# Patient Record
Sex: FEMALE | Race: BLACK OR AFRICAN AMERICAN | NOT HISPANIC OR LATINO | URBAN - METROPOLITAN AREA
[De-identification: names, ages, dates, MRNs, and addresses within clinical notes are randomized per-mention and may not be internally consistent; named-entity substitution may affect disease eponyms.]

---

## 2020-03-27 ENCOUNTER — INPATIENT (INPATIENT)
Facility: HOSPITAL | Age: 75
LOS: 11 days | Discharge: HOME | DRG: 177 | End: 2020-04-08
Attending: INTERNAL MEDICINE | Admitting: STUDENT IN AN ORGANIZED HEALTH CARE EDUCATION/TRAINING PROGRAM
Payer: MEDICARE

## 2020-03-27 VITALS
HEIGHT: 61 IN | DIASTOLIC BLOOD PRESSURE: 85 MMHG | SYSTOLIC BLOOD PRESSURE: 176 MMHG | WEIGHT: 182.98 LBS | RESPIRATION RATE: 20 BRPM | TEMPERATURE: 99 F | HEART RATE: 98 BPM | OXYGEN SATURATION: 94 %

## 2020-03-27 DIAGNOSIS — Z98.49 CATARACT EXTRACTION STATUS, UNSPECIFIED EYE: Chronic | ICD-10-CM

## 2020-03-27 PROCEDURE — 99285 EMERGENCY DEPT VISIT HI MDM: CPT

## 2020-03-27 PROCEDURE — 71045 X-RAY EXAM CHEST 1 VIEW: CPT | Mod: 26

## 2020-03-27 PROCEDURE — 93010 ELECTROCARDIOGRAM REPORT: CPT

## 2020-03-27 NOTE — ED PROVIDER NOTE - PROGRESS NOTE DETAILS
Alexander PGY1 - Discussed all results w/ pt., who understands them.  Pt. agreeable to admission.  All other questions and concerns have been addressed.  Pt. will be admitted.

## 2020-03-27 NOTE — ED PROVIDER NOTE - OBJECTIVE STATEMENT
74 y old f with hx DM hypertension ,HLD status post aortic valve replacement ,not on anticoagulation [facility], couth ,SOB for 1 week  apparently  chest xray shows bilateral pneumonia 74 y old f with hx DM hypertension ,HLD status post aortic valve replacement ,not on anticoagulation [facility] with fever, couth ,SOB, and occasional hemopt for 1 week  apparently chest xray at  shows bilateral pneumonia.  Pt. has also had three days of persistent NB diarrhea.  Pt. denies any recent long distance travel, 74 y old f with hx DM hypertension ,HLD status post aortic valve replacement ,not on anticoagulation [facility] fever, couth ,SOB, and occasional hemopt for 1 week  apparently chest xray at  shows bilateral pneumonia.  Pt. has also had three days of persistent NB diarrhea.  Pt. denies any recent long distance travel, leg pain, hx of DVT/PE, pulmonary diseases.  Pt. quit smoking over 30 years ago.

## 2020-03-28 DIAGNOSIS — Z29.9 ENCOUNTER FOR PROPHYLACTIC MEASURES, UNSPECIFIED: ICD-10-CM

## 2020-03-28 DIAGNOSIS — E11.9 TYPE 2 DIABETES MELLITUS WITHOUT COMPLICATIONS: ICD-10-CM

## 2020-03-28 DIAGNOSIS — Z95.2 PRESENCE OF PROSTHETIC HEART VALVE: ICD-10-CM

## 2020-03-28 DIAGNOSIS — J18.9 PNEUMONIA, UNSPECIFIED ORGANISM: ICD-10-CM

## 2020-03-28 DIAGNOSIS — Z95.2 PRESENCE OF PROSTHETIC HEART VALVE: Chronic | ICD-10-CM

## 2020-03-28 DIAGNOSIS — J96.91 RESPIRATORY FAILURE, UNSPECIFIED WITH HYPOXIA: ICD-10-CM

## 2020-03-28 DIAGNOSIS — I10 ESSENTIAL (PRIMARY) HYPERTENSION: ICD-10-CM

## 2020-03-28 DIAGNOSIS — F32.9 MAJOR DEPRESSIVE DISORDER, SINGLE EPISODE, UNSPECIFIED: ICD-10-CM

## 2020-03-28 DIAGNOSIS — M19.90 UNSPECIFIED OSTEOARTHRITIS, UNSPECIFIED SITE: ICD-10-CM

## 2020-03-28 DIAGNOSIS — E78.5 HYPERLIPIDEMIA, UNSPECIFIED: ICD-10-CM

## 2020-03-28 DIAGNOSIS — Z02.9 ENCOUNTER FOR ADMINISTRATIVE EXAMINATIONS, UNSPECIFIED: ICD-10-CM

## 2020-03-28 LAB
ALBUMIN SERPL ELPH-MCNC: 3.8 G/DL — SIGNIFICANT CHANGE UP (ref 3.3–5)
ALBUMIN SERPL ELPH-MCNC: 3.9 G/DL — SIGNIFICANT CHANGE UP (ref 3.3–5)
ALP SERPL-CCNC: 74 U/L — SIGNIFICANT CHANGE UP (ref 40–120)
ALP SERPL-CCNC: 79 U/L — SIGNIFICANT CHANGE UP (ref 40–120)
ALT FLD-CCNC: 23 U/L — SIGNIFICANT CHANGE UP (ref 10–45)
ALT FLD-CCNC: 25 U/L — SIGNIFICANT CHANGE UP (ref 10–45)
ANION GAP SERPL CALC-SCNC: 15 MMOL/L — SIGNIFICANT CHANGE UP (ref 5–17)
ANION GAP SERPL CALC-SCNC: 16 MMOL/L — SIGNIFICANT CHANGE UP (ref 5–17)
APPEARANCE UR: CLEAR — SIGNIFICANT CHANGE UP
APTT BLD: 75.4 SEC — HIGH (ref 27.5–36.3)
AST SERPL-CCNC: 42 U/L — HIGH (ref 10–40)
AST SERPL-CCNC: 43 U/L — HIGH (ref 10–40)
BACTERIA # UR AUTO: NEGATIVE — SIGNIFICANT CHANGE UP
BASOPHILS # BLD AUTO: 0 K/UL — SIGNIFICANT CHANGE UP (ref 0–0.2)
BASOPHILS # BLD AUTO: 0 K/UL — SIGNIFICANT CHANGE UP (ref 0–0.2)
BASOPHILS NFR BLD AUTO: 0 % — SIGNIFICANT CHANGE UP (ref 0–2)
BASOPHILS NFR BLD AUTO: 0 % — SIGNIFICANT CHANGE UP (ref 0–2)
BILIRUB SERPL-MCNC: 0.3 MG/DL — SIGNIFICANT CHANGE UP (ref 0.2–1.2)
BILIRUB SERPL-MCNC: 0.3 MG/DL — SIGNIFICANT CHANGE UP (ref 0.2–1.2)
BILIRUB UR-MCNC: NEGATIVE — SIGNIFICANT CHANGE UP
BUN SERPL-MCNC: 14 MG/DL — SIGNIFICANT CHANGE UP (ref 7–23)
BUN SERPL-MCNC: 15 MG/DL — SIGNIFICANT CHANGE UP (ref 7–23)
CALCIUM SERPL-MCNC: 9.3 MG/DL — SIGNIFICANT CHANGE UP (ref 8.4–10.5)
CALCIUM SERPL-MCNC: 9.4 MG/DL — SIGNIFICANT CHANGE UP (ref 8.4–10.5)
CHLORIDE SERPL-SCNC: 100 MMOL/L — SIGNIFICANT CHANGE UP (ref 96–108)
CHLORIDE SERPL-SCNC: 100 MMOL/L — SIGNIFICANT CHANGE UP (ref 96–108)
CO2 SERPL-SCNC: 22 MMOL/L — SIGNIFICANT CHANGE UP (ref 22–31)
CO2 SERPL-SCNC: 23 MMOL/L — SIGNIFICANT CHANGE UP (ref 22–31)
COLOR SPEC: YELLOW — SIGNIFICANT CHANGE UP
CREAT SERPL-MCNC: 0.77 MG/DL — SIGNIFICANT CHANGE UP (ref 0.5–1.3)
CREAT SERPL-MCNC: 0.78 MG/DL — SIGNIFICANT CHANGE UP (ref 0.5–1.3)
CRP SERPL-MCNC: 8.53 MG/DL — HIGH (ref 0–0.4)
DIFF PNL FLD: ABNORMAL
EOSINOPHIL # BLD AUTO: 0 K/UL — SIGNIFICANT CHANGE UP (ref 0–0.5)
EOSINOPHIL # BLD AUTO: 0 K/UL — SIGNIFICANT CHANGE UP (ref 0–0.5)
EOSINOPHIL NFR BLD AUTO: 0 % — SIGNIFICANT CHANGE UP (ref 0–6)
EOSINOPHIL NFR BLD AUTO: 0 % — SIGNIFICANT CHANGE UP (ref 0–6)
EPI CELLS # UR: 4 /HPF — SIGNIFICANT CHANGE UP
ERYTHROCYTE [SEDIMENTATION RATE] IN BLOOD: 111 MM/HR — HIGH (ref 0–20)
GLUCOSE SERPL-MCNC: 113 MG/DL — HIGH (ref 70–99)
GLUCOSE SERPL-MCNC: 124 MG/DL — HIGH (ref 70–99)
GLUCOSE UR QL: NEGATIVE — SIGNIFICANT CHANGE UP
HCT VFR BLD CALC: 39 % — SIGNIFICANT CHANGE UP (ref 34.5–45)
HCT VFR BLD CALC: 40.6 % — SIGNIFICANT CHANGE UP (ref 34.5–45)
HGB BLD-MCNC: 12.1 G/DL — SIGNIFICANT CHANGE UP (ref 11.5–15.5)
HGB BLD-MCNC: 12.9 G/DL — SIGNIFICANT CHANGE UP (ref 11.5–15.5)
HYALINE CASTS # UR AUTO: 1 /LPF — SIGNIFICANT CHANGE UP (ref 0–2)
INR BLD: 1.08 RATIO — SIGNIFICANT CHANGE UP (ref 0.88–1.16)
KETONES UR-MCNC: NEGATIVE — SIGNIFICANT CHANGE UP
LEUKOCYTE ESTERASE UR-ACNC: NEGATIVE — SIGNIFICANT CHANGE UP
LYMPHOCYTES # BLD AUTO: 0.86 K/UL — LOW (ref 1–3.3)
LYMPHOCYTES # BLD AUTO: 1.26 K/UL — SIGNIFICANT CHANGE UP (ref 1–3.3)
LYMPHOCYTES # BLD AUTO: 19.3 % — SIGNIFICANT CHANGE UP (ref 13–44)
LYMPHOCYTES # BLD AUTO: 22.3 % — SIGNIFICANT CHANGE UP (ref 13–44)
MAGNESIUM SERPL-MCNC: 1.8 MG/DL — SIGNIFICANT CHANGE UP (ref 1.6–2.6)
MANUAL SMEAR VERIFICATION: SIGNIFICANT CHANGE UP
MCHC RBC-ENTMCNC: 25.2 PG — LOW (ref 27–34)
MCHC RBC-ENTMCNC: 25.3 PG — LOW (ref 27–34)
MCHC RBC-ENTMCNC: 31 GM/DL — LOW (ref 32–36)
MCHC RBC-ENTMCNC: 31.8 GM/DL — LOW (ref 32–36)
MCV RBC AUTO: 79.6 FL — LOW (ref 80–100)
MCV RBC AUTO: 81.3 FL — SIGNIFICANT CHANGE UP (ref 80–100)
MONOCYTES # BLD AUTO: 0.19 K/UL — SIGNIFICANT CHANGE UP (ref 0–0.9)
MONOCYTES # BLD AUTO: 0.39 K/UL — SIGNIFICANT CHANGE UP (ref 0–0.9)
MONOCYTES NFR BLD AUTO: 3.3 % — SIGNIFICANT CHANGE UP (ref 2–14)
MONOCYTES NFR BLD AUTO: 8.8 % — SIGNIFICANT CHANGE UP (ref 2–14)
NEUTROPHILS # BLD AUTO: 3.19 K/UL — SIGNIFICANT CHANGE UP (ref 1.8–7.4)
NEUTROPHILS # BLD AUTO: 4.07 K/UL — SIGNIFICANT CHANGE UP (ref 1.8–7.4)
NEUTROPHILS NFR BLD AUTO: 70.2 % — SIGNIFICANT CHANGE UP (ref 43–77)
NEUTROPHILS NFR BLD AUTO: 71.9 % — SIGNIFICANT CHANGE UP (ref 43–77)
NITRITE UR-MCNC: NEGATIVE — SIGNIFICANT CHANGE UP
PH UR: 6.5 — SIGNIFICANT CHANGE UP (ref 5–8)
PHOSPHATE SERPL-MCNC: 3.6 MG/DL — SIGNIFICANT CHANGE UP (ref 2.5–4.5)
PLAT MORPH BLD: NORMAL — SIGNIFICANT CHANGE UP
PLATELET # BLD AUTO: 304 K/UL — SIGNIFICANT CHANGE UP (ref 150–400)
PLATELET # BLD AUTO: 322 K/UL — SIGNIFICANT CHANGE UP (ref 150–400)
POTASSIUM SERPL-MCNC: 3.8 MMOL/L — SIGNIFICANT CHANGE UP (ref 3.5–5.3)
POTASSIUM SERPL-MCNC: 3.9 MMOL/L — SIGNIFICANT CHANGE UP (ref 3.5–5.3)
POTASSIUM SERPL-SCNC: 3.8 MMOL/L — SIGNIFICANT CHANGE UP (ref 3.5–5.3)
POTASSIUM SERPL-SCNC: 3.9 MMOL/L — SIGNIFICANT CHANGE UP (ref 3.5–5.3)
PROCALCITONIN SERPL-MCNC: 0.16 NG/ML — HIGH (ref 0.02–0.1)
PROT SERPL-MCNC: 7.4 G/DL — SIGNIFICANT CHANGE UP (ref 6–8.3)
PROT SERPL-MCNC: 7.8 G/DL — SIGNIFICANT CHANGE UP (ref 6–8.3)
PROT UR-MCNC: ABNORMAL
PROTHROM AB SERPL-ACNC: 12.3 SEC — SIGNIFICANT CHANGE UP (ref 10–12.9)
RBC # BLD: 4.8 M/UL — SIGNIFICANT CHANGE UP (ref 3.8–5.2)
RBC # BLD: 5.1 M/UL — SIGNIFICANT CHANGE UP (ref 3.8–5.2)
RBC # FLD: 14.5 % — SIGNIFICANT CHANGE UP (ref 10.3–14.5)
RBC # FLD: 14.6 % — HIGH (ref 10.3–14.5)
RBC BLD AUTO: SIGNIFICANT CHANGE UP
RBC CASTS # UR COMP ASSIST: 2 /HPF — SIGNIFICANT CHANGE UP (ref 0–4)
SARS-COV-2 RNA SPEC QL NAA+PROBE: SIGNIFICANT CHANGE UP
SODIUM SERPL-SCNC: 138 MMOL/L — SIGNIFICANT CHANGE UP (ref 135–145)
SODIUM SERPL-SCNC: 140 MMOL/L — SIGNIFICANT CHANGE UP (ref 135–145)
SP GR SPEC: 1.03 — HIGH (ref 1.01–1.02)
TROPONIN T, HIGH SENSITIVITY RESULT: 22 NG/L — SIGNIFICANT CHANGE UP (ref 0–51)
UROBILINOGEN FLD QL: ABNORMAL
VARIANT LYMPHS # BLD: 2.5 % — SIGNIFICANT CHANGE UP (ref 0–6)
WBC # BLD: 4.44 K/UL — SIGNIFICANT CHANGE UP (ref 3.8–10.5)
WBC # BLD: 5.66 K/UL — SIGNIFICANT CHANGE UP (ref 3.8–10.5)
WBC # FLD AUTO: 4.44 K/UL — SIGNIFICANT CHANGE UP (ref 3.8–10.5)
WBC # FLD AUTO: 5.66 K/UL — SIGNIFICANT CHANGE UP (ref 3.8–10.5)
WBC UR QL: 3 /HPF — SIGNIFICANT CHANGE UP (ref 0–5)

## 2020-03-28 PROCEDURE — 12345: CPT | Mod: NC

## 2020-03-28 RX ORDER — AMLODIPINE BESYLATE 2.5 MG/1
1 TABLET ORAL
Qty: 0 | Refills: 0 | DISCHARGE

## 2020-03-28 RX ORDER — CEFTRIAXONE 500 MG/1
1000 INJECTION, POWDER, FOR SOLUTION INTRAMUSCULAR; INTRAVENOUS ONCE
Refills: 0 | Status: COMPLETED | OUTPATIENT
Start: 2020-03-28 | End: 2020-03-28

## 2020-03-28 RX ORDER — SERTRALINE 25 MG/1
50 TABLET, FILM COATED ORAL DAILY
Refills: 0 | Status: DISCONTINUED | OUTPATIENT
Start: 2020-03-28 | End: 2020-04-08

## 2020-03-28 RX ORDER — ATORVASTATIN CALCIUM 80 MG/1
20 TABLET, FILM COATED ORAL AT BEDTIME
Refills: 0 | Status: DISCONTINUED | OUTPATIENT
Start: 2020-03-28 | End: 2020-04-08

## 2020-03-28 RX ORDER — ATORVASTATIN CALCIUM 80 MG/1
1 TABLET, FILM COATED ORAL
Qty: 0 | Refills: 0 | DISCHARGE

## 2020-03-28 RX ORDER — CARVEDILOL PHOSPHATE 80 MG/1
1 CAPSULE, EXTENDED RELEASE ORAL
Qty: 0 | Refills: 0 | DISCHARGE

## 2020-03-28 RX ORDER — SODIUM CHLORIDE 9 MG/ML
1000 INJECTION, SOLUTION INTRAVENOUS
Refills: 0 | Status: DISCONTINUED | OUTPATIENT
Start: 2020-03-28 | End: 2020-04-08

## 2020-03-28 RX ORDER — CEFTRIAXONE 500 MG/1
INJECTION, POWDER, FOR SOLUTION INTRAMUSCULAR; INTRAVENOUS
Refills: 0 | Status: DISCONTINUED | OUTPATIENT
Start: 2020-03-28 | End: 2020-03-29

## 2020-03-28 RX ORDER — SITAGLIPTIN AND METFORMIN HYDROCHLORIDE 500; 50 MG/1; MG/1
1 TABLET, FILM COATED ORAL
Qty: 0 | Refills: 0 | DISCHARGE

## 2020-03-28 RX ORDER — INSULIN LISPRO 100/ML
VIAL (ML) SUBCUTANEOUS
Refills: 0 | Status: DISCONTINUED | OUTPATIENT
Start: 2020-03-28 | End: 2020-04-08

## 2020-03-28 RX ORDER — INSULIN LISPRO 100/ML
VIAL (ML) SUBCUTANEOUS AT BEDTIME
Refills: 0 | Status: DISCONTINUED | OUTPATIENT
Start: 2020-03-28 | End: 2020-04-08

## 2020-03-28 RX ORDER — SERTRALINE 25 MG/1
1 TABLET, FILM COATED ORAL
Qty: 0 | Refills: 0 | DISCHARGE

## 2020-03-28 RX ORDER — ENOXAPARIN SODIUM 100 MG/ML
40 INJECTION SUBCUTANEOUS DAILY
Refills: 0 | Status: DISCONTINUED | OUTPATIENT
Start: 2020-03-28 | End: 2020-04-08

## 2020-03-28 RX ORDER — DEXTROSE 50 % IN WATER 50 %
12.5 SYRINGE (ML) INTRAVENOUS ONCE
Refills: 0 | Status: DISCONTINUED | OUTPATIENT
Start: 2020-03-28 | End: 2020-04-08

## 2020-03-28 RX ORDER — ACETAMINOPHEN 500 MG
975 TABLET ORAL ONCE
Refills: 0 | Status: COMPLETED | OUTPATIENT
Start: 2020-03-28 | End: 2020-03-28

## 2020-03-28 RX ORDER — AMLODIPINE BESYLATE 2.5 MG/1
10 TABLET ORAL DAILY
Refills: 0 | Status: DISCONTINUED | OUTPATIENT
Start: 2020-03-28 | End: 2020-04-08

## 2020-03-28 RX ORDER — AZITHROMYCIN 500 MG/1
500 TABLET, FILM COATED ORAL ONCE
Refills: 0 | Status: COMPLETED | OUTPATIENT
Start: 2020-03-28 | End: 2020-03-28

## 2020-03-28 RX ORDER — PANTOPRAZOLE SODIUM 20 MG/1
40 TABLET, DELAYED RELEASE ORAL
Refills: 0 | Status: DISCONTINUED | OUTPATIENT
Start: 2020-03-28 | End: 2020-04-08

## 2020-03-28 RX ORDER — DEXTROSE 50 % IN WATER 50 %
25 SYRINGE (ML) INTRAVENOUS ONCE
Refills: 0 | Status: DISCONTINUED | OUTPATIENT
Start: 2020-03-28 | End: 2020-04-08

## 2020-03-28 RX ORDER — AZITHROMYCIN 500 MG/1
TABLET, FILM COATED ORAL
Refills: 0 | Status: DISCONTINUED | OUTPATIENT
Start: 2020-03-28 | End: 2020-03-29

## 2020-03-28 RX ORDER — DEXTROSE 50 % IN WATER 50 %
15 SYRINGE (ML) INTRAVENOUS ONCE
Refills: 0 | Status: DISCONTINUED | OUTPATIENT
Start: 2020-03-28 | End: 2020-04-08

## 2020-03-28 RX ORDER — AZITHROMYCIN 500 MG/1
500 TABLET, FILM COATED ORAL EVERY 24 HOURS
Refills: 0 | Status: DISCONTINUED | OUTPATIENT
Start: 2020-03-29 | End: 2020-03-29

## 2020-03-28 RX ORDER — CEFTRIAXONE 500 MG/1
1000 INJECTION, POWDER, FOR SOLUTION INTRAMUSCULAR; INTRAVENOUS EVERY 24 HOURS
Refills: 0 | Status: DISCONTINUED | OUTPATIENT
Start: 2020-03-29 | End: 2020-03-29

## 2020-03-28 RX ORDER — DAPAGLIFLOZIN 10 MG/1
1 TABLET, FILM COATED ORAL
Qty: 0 | Refills: 0 | DISCHARGE

## 2020-03-28 RX ORDER — ACETAMINOPHEN 500 MG
650 TABLET ORAL EVERY 6 HOURS
Refills: 0 | Status: DISCONTINUED | OUTPATIENT
Start: 2020-03-28 | End: 2020-04-08

## 2020-03-28 RX ORDER — CARVEDILOL PHOSPHATE 80 MG/1
25 CAPSULE, EXTENDED RELEASE ORAL EVERY 12 HOURS
Refills: 0 | Status: DISCONTINUED | OUTPATIENT
Start: 2020-03-28 | End: 2020-04-08

## 2020-03-28 RX ORDER — GLUCAGON INJECTION, SOLUTION 0.5 MG/.1ML
1 INJECTION, SOLUTION SUBCUTANEOUS ONCE
Refills: 0 | Status: DISCONTINUED | OUTPATIENT
Start: 2020-03-28 | End: 2020-04-08

## 2020-03-28 RX ADMIN — ENOXAPARIN SODIUM 40 MILLIGRAM(S): 100 INJECTION SUBCUTANEOUS at 08:42

## 2020-03-28 RX ADMIN — AMLODIPINE BESYLATE 10 MILLIGRAM(S): 2.5 TABLET ORAL at 05:47

## 2020-03-28 RX ADMIN — Medication 200 MILLIGRAM(S): at 12:50

## 2020-03-28 RX ADMIN — SERTRALINE 50 MILLIGRAM(S): 25 TABLET, FILM COATED ORAL at 12:50

## 2020-03-28 RX ADMIN — Medication 2: at 13:25

## 2020-03-28 RX ADMIN — Medication 975 MILLIGRAM(S): at 00:40

## 2020-03-28 RX ADMIN — CARVEDILOL PHOSPHATE 25 MILLIGRAM(S): 80 CAPSULE, EXTENDED RELEASE ORAL at 17:42

## 2020-03-28 RX ADMIN — Medication 100 MILLIGRAM(S): at 22:45

## 2020-03-28 RX ADMIN — Medication 650 MILLIGRAM(S): at 23:20

## 2020-03-28 RX ADMIN — CARVEDILOL PHOSPHATE 25 MILLIGRAM(S): 80 CAPSULE, EXTENDED RELEASE ORAL at 05:47

## 2020-03-28 RX ADMIN — Medication 200 MILLIGRAM(S): at 22:49

## 2020-03-28 RX ADMIN — AZITHROMYCIN 250 MILLIGRAM(S): 500 TABLET, FILM COATED ORAL at 13:24

## 2020-03-28 RX ADMIN — CEFTRIAXONE 100 MILLIGRAM(S): 500 INJECTION, POWDER, FOR SOLUTION INTRAMUSCULAR; INTRAVENOUS at 12:50

## 2020-03-28 RX ADMIN — Medication 650 MILLIGRAM(S): at 22:48

## 2020-03-28 RX ADMIN — Medication 100 MILLIGRAM(S): at 05:47

## 2020-03-28 RX ADMIN — Medication 975 MILLIGRAM(S): at 00:10

## 2020-03-28 RX ADMIN — Medication 650 MILLIGRAM(S): at 14:54

## 2020-03-28 RX ADMIN — PANTOPRAZOLE SODIUM 40 MILLIGRAM(S): 20 TABLET, DELAYED RELEASE ORAL at 08:41

## 2020-03-28 RX ADMIN — ATORVASTATIN CALCIUM 20 MILLIGRAM(S): 80 TABLET, FILM COATED ORAL at 22:45

## 2020-03-28 RX ADMIN — Medication 100 MILLIGRAM(S): at 13:24

## 2020-03-28 NOTE — H&P ADULT - PROBLEM SELECTOR PLAN 1
Patient with high suspicion for COVID, initial test was negative, pending repeat testing; can't rule out superimposed bacterial pneumonia  hold off antibiotics at this time  - Continue with supplemental oxygen with goal SpO2>92, if requires escalate to NRB and avoid BiLevel/High Flow Nasal Cannula per institution policy given risk of aerosolization  - If COVID positive, Start Hydroxychloroquine 400mg x2 doses and then 200mg BID for 4 days. The Benefits/Risks/Alternative discussed with next of kin in agreement with therapy although not currently FDA-approved for the treatment of COVID19  check ESR, CRP, Procalcitonin  - monitor EKG for QTc prolongation  - guaifenessin/tessalon perles/albuterol inh prn, acetaminophen prn  - isolation precautions Patient with hypoxic respiratory failure, likely 2/2 to COVID; can't r/o superimposed bacterial pneumonia  c/w oxygen  c/w other symptomatic management

## 2020-03-28 NOTE — H&P ADULT - NSHPLABSRESULTS_GEN_ALL_CORE
Labs personally reviewed:                          12.9   5.66  )-----------( 322      ( 27 Mar 2020 23:52 )             40.6     03-27    138  |  100  |  14  ----------------------------<  124<H>  3.9   |  23  |  0.78    Ca    9.4      27 Mar 2020 23:52    TPro  7.8  /  Alb  3.8  /  TBili  0.3  /  DBili  x   /  AST  42<H>  /  ALT  23  /  AlkPhos  79  03-27        LIVER FUNCTIONS - ( 27 Mar 2020 23:52 )  Alb: 3.8 g/dL / Pro: 7.8 g/dL / ALK PHOS: 79 U/L / ALT: 23 U/L / AST: 42 U/L / GGT: x           PT/INR - ( 27 Mar 2020 23:52 )   PT: 12.3 sec;   INR: 1.08 ratio         PTT - ( 27 Mar 2020 23:52 )  PTT:75.4 sec    CAPILLARY BLOOD GLUCOSE          Imaging:  CXR personally reviewed:   Bilateral patchy opacities, likely representing multifocal pneumonia (differential considerations include viral pneumonia, such as COVID-19).    EKG personally reviewed: nsr, QTc 447

## 2020-03-28 NOTE — H&P ADULT - PROBLEM SELECTOR PLAN 9
1.  Name of PCP:  Dr. Margi Aparicio  2.  PCP Contacted on Admission: [ ] Y    [x ] N    3.  PCP contacted at Discharge: [ ] Y    [ ] N    [ ] N/A  4.  Post-Discharge Appointment Date and Location:  5.  Summary of Handoff given to PCP: lovenox

## 2020-03-28 NOTE — H&P ADULT - PROBLEM SELECTOR PLAN 2
bioprosthetic valve, not on AC Patient with high suspicion for COVID, initial test was negative, pending repeat testing; can't rule out superimposed bacterial pneumonia  hold off antibiotics at this time  - Continue with supplemental oxygen with goal SpO2>92, if requires escalate to NRB and avoid BiLevel/High Flow Nasal Cannula per institution policy given risk of aerosolization  - If COVID positive, Start Hydroxychloroquine 400mg x2 doses and then 200mg BID for 4 days. The Benefits/Risks/Alternative discussed with next of kin in agreement with therapy although not currently FDA-approved for the treatment of COVID19  check ESR, CRP, Procalcitonin  - monitor EKG for QTc prolongation  - guaifenessin/tessalon perles/albuterol inh prn, acetaminophen prn  - isolation precautions Patient with high suspicion for COVID, initial test was negative, pending repeat testing; can't rule out superimposed bacterial pneumonia  hold off antibiotics at this time  - Continue with supplemental oxygen with goal SpO2>92, if requires escalate to NRB and avoid BiLevel/High Flow Nasal Cannula per institution policy given risk of aerosolization  - If COVID positive, Start Hydroxychloroquine 400mg x2 doses and then 200mg BID for 4 days. The Benefits/Risks/Alternative discussed with next of kin in agreement with therapy although not currently FDA-approved for the treatment of COVID19  check ESR, CRP, Procalcitonin  - monitor EKG for QTc prolongation  - guaifenessin/tessalon perles/albuterol inh prn, acetaminophen prn  - isolation precautions  - f/u blood culture Patient with high suspicion for COVID, initial test was negative, pending repeat testing; can't rule out superimposed bacterial pneumonia  hold off antibiotics at this time  - Continue with supplemental oxygen with goal SpO2>92, if requires escalate to NRB and avoid BiLevel/High Flow Nasal Cannula per institution policy given risk of aerosolization  - If COVID positive, Start Hydroxychloroquine 400mg x2 doses and then 200mg BID for 4 days. The Benefits/Risks/Alternative discussed with next of kin in agreement with therapy although not currently FDA-approved for the treatment of COVID19  check ESR, CRP, Procalcitonin  - monitor EKG for QTc prolongation  - guaifenessin/tessalon perles/ acetaminophen prn  - isolation precautions  - f/u blood culture

## 2020-03-28 NOTE — H&P ADULT - NSHPPHYSICALEXAM_GEN_ALL_CORE
PHYSICAL EXAM:  Vital Signs Last 24 Hrs  T(C): 36.8 (03-28-20 @ 02:31)  T(F): 98.2 (03-28-20 @ 02:31), Max: 100.4 (03-28-20 @ 00:01)  HR: 80 (03-28-20 @ 02:31) (80 - 98)  BP: 145/79 (03-28-20 @ 02:31)  BP(mean): --  RR: 20 (03-28-20 @ 02:31) (18 - 25)  SpO2: 98% (03-28-20 @ 02:31) (92% - 98%)  Wt(kg): --    Physical exam deferred due to concern for COVID  Please refer to ED exam note as detailed below    · CONSTITUTIONAL: morbidly obese  awake, alert, oriented to person, place, time/situatino  dyspneic  · ENMT: Airway patent, Nasal mucosa clear. Mouth with normal mucosa. Throat has no vesicles, no oropharyngeal exudates and uvula is midline.  · EYES: Clear bilaterally, pupils equal, round and reactive to light.  · CARDIAC: Normal rate, regular rhythm.  Heart sounds S1, S syst m on apex  · RESPIRATORY: bilateral rales  · GASTROINTESTINAL: Abdomen soft, non-tender, no guarding.  · MUSCULOSKELETAL: Spine appears normal, range of motion is not limited, no muscle or joint tenderness  · NEUROLOGICAL: Alert and oriented, no focal deficits, no motor or sensory deficits.  · SKIN: Skin normal color for race, warm, dry and intact. No evidence of rash.  · PSYCHIATRIC: Alert and oriented to person, place, time/situation. normal mood and affect. no apparent risk to self or others.  · HEME LYMPH: No adenopathy or splenomegaly. No cervical or inguinal lymphadenopathy.

## 2020-03-28 NOTE — H&P ADULT - NSICDXPASTSURGICALHX_GEN_ALL_CORE_FT
PAST SURGICAL HISTORY:  History of cataract surgery     S/P AVR     S/P  section     S/P cholecystectomy

## 2020-03-28 NOTE — ED ADULT NURSE NOTE - OBJECTIVE STATEMENT
73 y/o F A&Ox3 with PMH of DM, HTN, HLD status post aortic valve replacement, presents to the ED sent from [facility] c/o fever, cough SOB. Pt. had outpatient chest xray done at  showing bilateral pneumonia. Pt. endorses SOB, worse during exertion. Also reports occasional hemoptosis     came in from [facility] with fever, couth ,SOB, and occasional hemopt for 1 week  apparently chest xray at  shows bilateral pneumonia.  Pt. has also had three days of persistent NB diarrhea.  Pt. denies any recent long distance travel, leg pain, hx of DVT/PE, pulmonary diseases.  Pt. quit smoking over 30 years ago. 75 y/o F A&Ox3 with PMH of DM, HTN, HLD status post aortic valve replacement, [facility], cough SOB. Pt. had outpatient chest x ray done at urgent care showing bilateral pneumonia. Pt. endorses SOB, worse during exertion. Also reports occasional hemoptysis x 1 week. Upon assessment, pt. oral temp of 100.4 - states she did not take any Tylenol today. Pt. appears slightly tachypneic. pt. was sating at 92% on RA - placed on 2L NC sating at 96%. Pt. also reports nausea and diarrhea x3 days. Denies CP, dizziness, and recent travel. Stopped smoking 30 years ago. Safety and comfort provided.

## 2020-03-28 NOTE — PATIENT PROFILE ADULT - NSPRESCRALCAMT_GEN_A_NUR
Lisa,I'm taking a screw out in clinic. I need the screwdriver for the synthes 4.0 screw. Can I grab that from you on Wednesday?Thanks
1 or 2

## 2020-03-28 NOTE — ED ADULT NURSE NOTE - PMH
Aortic insufficiency    CAD (coronary artery disease)    Disk prolapse    DM (diabetes mellitus)    HLD (hyperlipidemia)    HTN (hypertension)    Obesity (BMI 30-39.9)    FLORES (obstructive sleep apnea)  by criteria

## 2020-03-28 NOTE — H&P ADULT - PROBLEM SELECTOR PLAN 10
1.  Name of PCP:  Dr. Margi Aparicio  2.  PCP Contacted on Admission: [ ] Y    [x ] N    3.  PCP contacted at Discharge: [ ] Y    [ ] N    [ ] N/A  4.  Post-Discharge Appointment Date and Location:  5.  Summary of Handoff given to PCP:

## 2020-03-28 NOTE — ED ADULT NURSE REASSESSMENT NOTE - NS ED NURSE REASSESS COMMENT FT1
Report received from Alejandra. Pt AAOx4, NAD, resp nonlabored, skin warm/dry, resting comfortably in bed. Pt denies headache, dizziness, chest pain, palpitations, SOB, abd pain, n/v/d, urinary symptoms, fevers, chills, weakness at this time. Pt awaiting bed. Safety maintained.

## 2020-03-28 NOTE — H&P ADULT - ATTENDING COMMENTS
Patient assigned to me by night hospitalist in charge for management and care for patient for this evening only. Care to be resumed by day hospitalist (Dr. Sawant) in the morning and thereafter.     Patient's care rendered on 3/28/2020 at 4AM.      I was physically present for the key portions of the evaluation and management (E/M) service provided.  I agree with the above history, physical, and plan which I have reviewed and edited where appropriate.     Plan discussed with Patient, RN, son, George

## 2020-03-28 NOTE — CHART NOTE - NSCHARTNOTEFT_GEN_A_CORE
Patient seen and examined.   Case discussed over phone with son, who provided additional history: Son works for [employer]. And patients sister passed away 3/27 from COVID-19.     Labs reviewed: CRP 8.53  Procal: 0.16  COVID-19: Negative    CXR reviewed: bilateral patchy hazy opacities consistent with pneumonia verse edema  ECG: QTc 447    A/P 73 yo female with history of HTN, HLD, T2DM, Arthritis, Aortic insufficiency s/p AVR (bioprosthetic) presented with fevers, dyspnea, cough and found to have CXR findings concerning for pneumonia with superimposed edema    PNA complicated by hypoxic respiratory failure: We are ruling out COVID, which seems appropriate as she is high risk with known contacts  In the interim, will start antibiotics, which can be discontinued if COVID is negative  Stable on 2L NC, will titrate as guided by pulse ox  Tylenol for fevers (added)  Will send urine legionella, and if COVID negative, RVP should be sent  Will add on troponin

## 2020-03-28 NOTE — H&P ADULT - NSICDXPASTMEDICALHX_GEN_ALL_CORE_FT
PAST MEDICAL HISTORY:  Aortic insufficiency     Arthritis     CAD (coronary artery disease)     Disk prolapse     DM (diabetes mellitus)     HLD (hyperlipidemia)     HTN (hypertension)     Obesity (BMI 30-39.9)     FLORES (obstructive sleep apnea) by criteria PAST MEDICAL HISTORY:  Aortic insufficiency     Arthritis     CAD (coronary artery disease)     Depression     Disk prolapse     DM (diabetes mellitus)     HLD (hyperlipidemia)     HTN (hypertension)     Obesity (BMI 30-39.9)     FLORES (obstructive sleep apnea) by criteria

## 2020-03-29 LAB
LEGIONELLA AG UR QL: NEGATIVE — SIGNIFICANT CHANGE UP
SARS-COV-2 RNA SPEC QL NAA+PROBE: DETECTED

## 2020-03-29 PROCEDURE — 99233 SBSQ HOSP IP/OBS HIGH 50: CPT

## 2020-03-29 PROCEDURE — 93010 ELECTROCARDIOGRAM REPORT: CPT

## 2020-03-29 RX ORDER — HYDROXYCHLOROQUINE SULFATE 200 MG
400 TABLET ORAL EVERY 12 HOURS
Refills: 0 | Status: COMPLETED | OUTPATIENT
Start: 2020-03-29 | End: 2020-03-29

## 2020-03-29 RX ORDER — ASCORBIC ACID 60 MG
500 TABLET,CHEWABLE ORAL DAILY
Refills: 0 | Status: DISCONTINUED | OUTPATIENT
Start: 2020-03-29 | End: 2020-04-08

## 2020-03-29 RX ORDER — AZITHROMYCIN 500 MG/1
500 TABLET, FILM COATED ORAL EVERY 24 HOURS
Refills: 0 | Status: COMPLETED | OUTPATIENT
Start: 2020-03-30 | End: 2020-03-30

## 2020-03-29 RX ORDER — THIAMINE MONONITRATE (VIT B1) 100 MG
100 TABLET ORAL DAILY
Refills: 0 | Status: DISCONTINUED | OUTPATIENT
Start: 2020-03-29 | End: 2020-04-08

## 2020-03-29 RX ORDER — HYDROXYCHLOROQUINE SULFATE 200 MG
TABLET ORAL
Refills: 0 | Status: COMPLETED | OUTPATIENT
Start: 2020-03-29 | End: 2020-04-02

## 2020-03-29 RX ORDER — HYDROXYCHLOROQUINE SULFATE 200 MG
200 TABLET ORAL EVERY 12 HOURS
Refills: 0 | Status: COMPLETED | OUTPATIENT
Start: 2020-03-30 | End: 2020-04-02

## 2020-03-29 RX ORDER — ZINC SULFATE TAB 220 MG (50 MG ZINC EQUIVALENT) 220 (50 ZN) MG
220 TAB ORAL DAILY
Refills: 0 | Status: DISCONTINUED | OUTPATIENT
Start: 2020-03-29 | End: 2020-04-08

## 2020-03-29 RX ADMIN — Medication 100 MILLIGRAM(S): at 23:01

## 2020-03-29 RX ADMIN — CEFTRIAXONE 100 MILLIGRAM(S): 500 INJECTION, POWDER, FOR SOLUTION INTRAMUSCULAR; INTRAVENOUS at 11:58

## 2020-03-29 RX ADMIN — PANTOPRAZOLE SODIUM 40 MILLIGRAM(S): 20 TABLET, DELAYED RELEASE ORAL at 06:09

## 2020-03-29 RX ADMIN — SERTRALINE 50 MILLIGRAM(S): 25 TABLET, FILM COATED ORAL at 11:58

## 2020-03-29 RX ADMIN — AMLODIPINE BESYLATE 10 MILLIGRAM(S): 2.5 TABLET ORAL at 06:09

## 2020-03-29 RX ADMIN — Medication 650 MILLIGRAM(S): at 05:50

## 2020-03-29 RX ADMIN — Medication 100 MILLIGRAM(S): at 16:14

## 2020-03-29 RX ADMIN — Medication 650 MILLIGRAM(S): at 23:01

## 2020-03-29 RX ADMIN — ATORVASTATIN CALCIUM 20 MILLIGRAM(S): 80 TABLET, FILM COATED ORAL at 23:01

## 2020-03-29 RX ADMIN — AZITHROMYCIN 250 MILLIGRAM(S): 500 TABLET, FILM COATED ORAL at 12:49

## 2020-03-29 RX ADMIN — Medication 100 MILLIGRAM(S): at 06:09

## 2020-03-29 RX ADMIN — Medication 400 MILLIGRAM(S): at 19:35

## 2020-03-29 RX ADMIN — CARVEDILOL PHOSPHATE 25 MILLIGRAM(S): 80 CAPSULE, EXTENDED RELEASE ORAL at 19:35

## 2020-03-29 RX ADMIN — CARVEDILOL PHOSPHATE 25 MILLIGRAM(S): 80 CAPSULE, EXTENDED RELEASE ORAL at 06:09

## 2020-03-29 RX ADMIN — Medication 650 MILLIGRAM(S): at 06:15

## 2020-03-29 RX ADMIN — Medication 400 MILLIGRAM(S): at 09:11

## 2020-03-29 NOTE — PROGRESS NOTE ADULT - SUBJECTIVE AND OBJECTIVE BOX
Patient is a 74y old  Female who presents with a chief complaint of cough and SOB (29 Mar 2020 11:49)        SUBJECTIVE / OVERNIGHT EVENTS: Patient reports some cough and SOB.       MEDICATIONS  (STANDING):  amLODIPine   Tablet 10 milliGRAM(s) Oral daily  atorvastatin 20 milliGRAM(s) Oral at bedtime  azithromycin  IVPB      azithromycin  IVPB 500 milliGRAM(s) IV Intermittent every 24 hours  benzonatate 100 milliGRAM(s) Oral every 8 hours  carvedilol 25 milliGRAM(s) Oral every 12 hours  cefTRIAXone   IVPB      cefTRIAXone   IVPB 1000 milliGRAM(s) IV Intermittent every 24 hours  dextrose 5%. 1000 milliLiter(s) (50 mL/Hr) IV Continuous <Continuous>  dextrose 50% Injectable 12.5 Gram(s) IV Push once  dextrose 50% Injectable 25 Gram(s) IV Push once  dextrose 50% Injectable 25 Gram(s) IV Push once  enoxaparin Injectable 40 milliGRAM(s) SubCutaneous daily  hydroxychloroquine 400 milliGRAM(s) Oral every 12 hours  hydroxychloroquine   Oral   insulin lispro (HumaLOG) corrective regimen sliding scale   SubCutaneous three times a day before meals  insulin lispro (HumaLOG) corrective regimen sliding scale   SubCutaneous at bedtime  pantoprazole    Tablet 40 milliGRAM(s) Oral before breakfast  sertraline 50 milliGRAM(s) Oral daily    MEDICATIONS  (PRN):  acetaminophen   Tablet .. 650 milliGRAM(s) Oral every 6 hours PRN Temp greater or equal to 38C (100.4F), Mild Pain (1 - 3)  dextrose 40% Gel 15 Gram(s) Oral once PRN Blood Glucose LESS THAN 70 milliGRAM(s)/deciliter  glucagon  Injectable 1 milliGRAM(s) IntraMuscular once PRN Glucose LESS THAN 70 milligrams/deciliter  guaiFENesin   Syrup  (Sugar-Free) 200 milliGRAM(s) Oral every 6 hours PRN Cough      Vital Signs Last 24 Hrs  T(C): 37.8 (29 Mar 2020 14:05), Max: 39.1 (28 Mar 2020 22:30)  T(F): 100.1 (29 Mar 2020 14:05), Max: 102.4 (28 Mar 2020 22:30)  HR: 82 (29 Mar 2020 14:05) (74 - 82)  BP: 115/57 (29 Mar 2020 14:05) (115/57 - 156/75)  BP(mean): --  RR: 20 (29 Mar 2020 14:05) (20 - 20)  SpO2: 89% (29 Mar 2020 14:05) (89% - 97%)  CAPILLARY BLOOD GLUCOSE      POCT Blood Glucose.: 139 mg/dL (29 Mar 2020 18:28)  POCT Blood Glucose.: 212 mg/dL (29 Mar 2020 13:59)  POCT Blood Glucose.: 111 mg/dL (29 Mar 2020 09:22)  POCT Blood Glucose.: 131 mg/dL (28 Mar 2020 21:26)    I&O's Summary    28 Mar 2020 07:01  -  29 Mar 2020 07:00  --------------------------------------------------------  IN: 120 mL / OUT: 200 mL / NET: -80 mL          PHYSICAL EXAM:   GENERAL: NAD, well-developed  HEAD:  Atraumatic, Normocephalic  EYES: Conjunctiva and sclera clear  NECK: Supple  CHEST/LUNG: Distant BS  HEART: S1S2 normal. Regular rate and rhythm; No murmurs, rubs, or gallops  ABDOMEN: Soft, Nontender, Nondistended; Bowel sounds present  EXTREMITIES:  Trace LE edema  PSYCH/Neuro: AAOx3. Non-focal.   SKIN: No rashes or lesions      LABS:                        12.1   4.44  )-----------( 304      ( 28 Mar 2020 06:32 )             39.0     03-28    140  |  100  |  15  ----------------------------<  113<H>  3.8   |  22  |  0.77    Ca    9.3      28 Mar 2020 06:32  Phos  3.6     03-28  Mg     1.8     03-28    TPro  7.4  /  Alb  3.9  /  TBili  0.3  /  DBili  x   /  AST  43<H>  /  ALT  25  /  AlkPhos  74  03-28    PT/INR - ( 27 Mar 2020 23:52 )   PT: 12.3 sec;   INR: 1.08 ratio         PTT - ( 27 Mar 2020 23:52 )  PTT:75.4 sec      Urinalysis Basic - ( 28 Mar 2020 15:33 )    Color: Yellow / Appearance: Clear / S.029 / pH: x  Gluc: x / Ketone: Negative  / Bili: Negative / Urobili: 2 mg/dL   Blood: x / Protein: 300 mg/dL / Nitrite: Negative   Leuk Esterase: Negative / RBC: 2 /hpf / WBC 3 /HPF   Sq Epi: x / Non Sq Epi: 4 /hpf / Bacteria: Negative          RADIOLOGY & ADDITIONAL TESTS:    Imaging Personally Reviewed:  Consultant(s) Notes Reviewed:    Care Discussed with Consultants/Other Providers:

## 2020-03-29 NOTE — PROGRESS NOTE ADULT - PROBLEM SELECTOR PLAN 1
Patient with hypoxic respiratory failure, 2/2 to COVID.  c/w oxygen, wean as tolerated.   c/w other symptomatic management  -Zinc, thiamine, Vitamin C.   -Trend CRP, ESR, ferritin, LDH.

## 2020-03-29 NOTE — PROGRESS NOTE ADULT - ATTENDING COMMENTS
Dae Szymanski MD  Division of [facility]  Cell: [phone]  Pager: [phone]  Office: [phone]

## 2020-03-30 LAB
ALBUMIN SERPL ELPH-MCNC: 3.1 G/DL — LOW (ref 3.3–5)
ALP SERPL-CCNC: 77 U/L — SIGNIFICANT CHANGE UP (ref 40–120)
ALT FLD-CCNC: 37 U/L — SIGNIFICANT CHANGE UP (ref 10–45)
ANION GAP SERPL CALC-SCNC: 12 MMOL/L — SIGNIFICANT CHANGE UP (ref 5–17)
AST SERPL-CCNC: 55 U/L — HIGH (ref 10–40)
BASOPHILS # BLD AUTO: 0 K/UL — SIGNIFICANT CHANGE UP (ref 0–0.2)
BASOPHILS NFR BLD AUTO: 0 % — SIGNIFICANT CHANGE UP (ref 0–2)
BILIRUB SERPL-MCNC: 0.3 MG/DL — SIGNIFICANT CHANGE UP (ref 0.2–1.2)
BUN SERPL-MCNC: 17 MG/DL — SIGNIFICANT CHANGE UP (ref 7–23)
CALCIUM SERPL-MCNC: 9.1 MG/DL — SIGNIFICANT CHANGE UP (ref 8.4–10.5)
CHLORIDE SERPL-SCNC: 99 MMOL/L — SIGNIFICANT CHANGE UP (ref 96–108)
CK SERPL-CCNC: 150 U/L — SIGNIFICANT CHANGE UP (ref 25–170)
CO2 SERPL-SCNC: 26 MMOL/L — SIGNIFICANT CHANGE UP (ref 22–31)
CREAT SERPL-MCNC: 0.93 MG/DL — SIGNIFICANT CHANGE UP (ref 0.5–1.3)
CRP SERPL-MCNC: 15.13 MG/DL — HIGH (ref 0–0.4)
D DIMER BLD IA.RAPID-MCNC: 455 NG/ML DDU — HIGH
EOSINOPHIL # BLD AUTO: 0 K/UL — SIGNIFICANT CHANGE UP (ref 0–0.5)
EOSINOPHIL NFR BLD AUTO: 0 % — SIGNIFICANT CHANGE UP (ref 0–6)
ERYTHROCYTE [SEDIMENTATION RATE] IN BLOOD: 78 MM/HR — HIGH (ref 0–20)
FERRITIN SERPL-MCNC: 382 NG/ML — HIGH (ref 15–150)
GIANT PLATELETS BLD QL SMEAR: PRESENT — SIGNIFICANT CHANGE UP
GLUCOSE BLDC GLUCOMTR-MCNC: 154 MG/DL — HIGH (ref 70–99)
GLUCOSE SERPL-MCNC: 111 MG/DL — HIGH (ref 70–99)
HCT VFR BLD CALC: 35.5 % — SIGNIFICANT CHANGE UP (ref 34.5–45)
HGB BLD-MCNC: 10.9 G/DL — LOW (ref 11.5–15.5)
LDH SERPL L TO P-CCNC: 551 U/L — HIGH (ref 50–242)
LYMPHOCYTES # BLD AUTO: 0.97 K/UL — LOW (ref 1–3.3)
LYMPHOCYTES # BLD AUTO: 16.8 % — SIGNIFICANT CHANGE UP (ref 13–44)
MANUAL SMEAR VERIFICATION: SIGNIFICANT CHANGE UP
MCHC RBC-ENTMCNC: 24.9 PG — LOW (ref 27–34)
MCHC RBC-ENTMCNC: 30.7 GM/DL — LOW (ref 32–36)
MCV RBC AUTO: 81.2 FL — SIGNIFICANT CHANGE UP (ref 80–100)
MONOCYTES # BLD AUTO: 0.1 K/UL — SIGNIFICANT CHANGE UP (ref 0–0.9)
MONOCYTES NFR BLD AUTO: 1.8 % — LOW (ref 2–14)
NEUTROPHILS # BLD AUTO: 4.7 K/UL — SIGNIFICANT CHANGE UP (ref 1.8–7.4)
NEUTROPHILS NFR BLD AUTO: 81.4 % — HIGH (ref 43–77)
PLAT MORPH BLD: NORMAL — SIGNIFICANT CHANGE UP
PLATELET # BLD AUTO: 315 K/UL — SIGNIFICANT CHANGE UP (ref 150–400)
POTASSIUM SERPL-MCNC: 4.4 MMOL/L — SIGNIFICANT CHANGE UP (ref 3.5–5.3)
POTASSIUM SERPL-SCNC: 4.4 MMOL/L — SIGNIFICANT CHANGE UP (ref 3.5–5.3)
PROCALCITONIN SERPL-MCNC: 0.49 NG/ML — HIGH (ref 0.02–0.1)
PROT SERPL-MCNC: 7 G/DL — SIGNIFICANT CHANGE UP (ref 6–8.3)
RBC # BLD: 4.37 M/UL — SIGNIFICANT CHANGE UP (ref 3.8–5.2)
RBC # FLD: 14.6 % — HIGH (ref 10.3–14.5)
RBC BLD AUTO: SIGNIFICANT CHANGE UP
SODIUM SERPL-SCNC: 137 MMOL/L — SIGNIFICANT CHANGE UP (ref 135–145)
TROPONIN T, HIGH SENSITIVITY RESULT: 23 NG/L — SIGNIFICANT CHANGE UP (ref 0–51)
WBC # BLD: 5.78 K/UL — SIGNIFICANT CHANGE UP (ref 3.8–10.5)
WBC # FLD AUTO: 5.78 K/UL — SIGNIFICANT CHANGE UP (ref 3.8–10.5)

## 2020-03-30 PROCEDURE — 99233 SBSQ HOSP IP/OBS HIGH 50: CPT

## 2020-03-30 PROCEDURE — 93010 ELECTROCARDIOGRAM REPORT: CPT

## 2020-03-30 RX ADMIN — Medication 650 MILLIGRAM(S): at 18:45

## 2020-03-30 RX ADMIN — Medication 600 MILLIGRAM(S): at 18:44

## 2020-03-30 RX ADMIN — Medication 200 MILLIGRAM(S): at 18:44

## 2020-03-30 RX ADMIN — CARVEDILOL PHOSPHATE 25 MILLIGRAM(S): 80 CAPSULE, EXTENDED RELEASE ORAL at 18:44

## 2020-03-30 RX ADMIN — Medication 2: at 18:44

## 2020-03-30 RX ADMIN — Medication 200 MILLIGRAM(S): at 06:34

## 2020-03-30 RX ADMIN — CARVEDILOL PHOSPHATE 25 MILLIGRAM(S): 80 CAPSULE, EXTENDED RELEASE ORAL at 06:34

## 2020-03-30 RX ADMIN — Medication 100 MILLIGRAM(S): at 06:34

## 2020-03-30 RX ADMIN — ZINC SULFATE TAB 220 MG (50 MG ZINC EQUIVALENT) 220 MILLIGRAM(S): 220 (50 ZN) TAB at 13:56

## 2020-03-30 RX ADMIN — Medication 500 MILLIGRAM(S): at 13:56

## 2020-03-30 RX ADMIN — Medication 100 MILLIGRAM(S): at 13:56

## 2020-03-30 RX ADMIN — AMLODIPINE BESYLATE 10 MILLIGRAM(S): 2.5 TABLET ORAL at 06:34

## 2020-03-30 RX ADMIN — SERTRALINE 50 MILLIGRAM(S): 25 TABLET, FILM COATED ORAL at 13:56

## 2020-03-30 RX ADMIN — AZITHROMYCIN 250 MILLIGRAM(S): 500 TABLET, FILM COATED ORAL at 13:56

## 2020-03-30 RX ADMIN — Medication 600 MILLIGRAM(S): at 06:34

## 2020-03-30 RX ADMIN — Medication 100 MILLIGRAM(S): at 22:36

## 2020-03-30 RX ADMIN — ENOXAPARIN SODIUM 40 MILLIGRAM(S): 100 INJECTION SUBCUTANEOUS at 14:20

## 2020-03-30 RX ADMIN — ATORVASTATIN CALCIUM 20 MILLIGRAM(S): 80 TABLET, FILM COATED ORAL at 22:36

## 2020-03-30 RX ADMIN — PANTOPRAZOLE SODIUM 40 MILLIGRAM(S): 20 TABLET, DELAYED RELEASE ORAL at 06:34

## 2020-03-30 NOTE — PROGRESS NOTE ADULT - SUBJECTIVE AND OBJECTIVE BOX
[facility] Division of Hospital Medicine Progress Note    Patient is a 74y old  Female who presents with a chief complaint of cough and SOB (29 Mar 2020 11:49)      SUBJECTIVE / OVERNIGHT EVENTS:  Patient states that she has been feeling better overnight. She did have fever 102F and was given tylenol. Currently satting 96% on 6L.  ADDITIONAL REVIEW OF SYSTEMS:  Denies abdominal pain, CP    MEDICATIONS  (STANDING):  amLODIPine   Tablet 10 milliGRAM(s) Oral daily  ascorbic acid 500 milliGRAM(s) Oral daily  atorvastatin 20 milliGRAM(s) Oral at bedtime  azithromycin  IVPB 500 milliGRAM(s) IV Intermittent every 24 hours  benzonatate 100 milliGRAM(s) Oral every 8 hours  carvedilol 25 milliGRAM(s) Oral every 12 hours  dextrose 5%. 1000 milliLiter(s) (50 mL/Hr) IV Continuous <Continuous>  dextrose 50% Injectable 12.5 Gram(s) IV Push once  dextrose 50% Injectable 25 Gram(s) IV Push once  dextrose 50% Injectable 25 Gram(s) IV Push once  enoxaparin Injectable 40 milliGRAM(s) SubCutaneous daily  guaiFENesin  milliGRAM(s) Oral every 12 hours  hydroxychloroquine 200 milliGRAM(s) Oral every 12 hours  hydroxychloroquine   Oral   insulin lispro (HumaLOG) corrective regimen sliding scale   SubCutaneous three times a day before meals  insulin lispro (HumaLOG) corrective regimen sliding scale   SubCutaneous at bedtime  pantoprazole    Tablet 40 milliGRAM(s) Oral before breakfast  sertraline 50 milliGRAM(s) Oral daily  thiamine 100 milliGRAM(s) Oral daily  zinc sulfate 220 milliGRAM(s) Oral daily    MEDICATIONS  (PRN):  acetaminophen   Tablet .. 650 milliGRAM(s) Oral every 6 hours PRN Temp greater or equal to 38C (100.4F), Mild Pain (1 - 3)  dextrose 40% Gel 15 Gram(s) Oral once PRN Blood Glucose LESS THAN 70 milliGRAM(s)/deciliter  glucagon  Injectable 1 milliGRAM(s) IntraMuscular once PRN Glucose LESS THAN 70 milligrams/deciliter      CAPILLARY BLOOD GLUCOSE      POCT Blood Glucose.: 121 mg/dL (30 Mar 2020 13:22)  POCT Blood Glucose.: 98 mg/dL (30 Mar 2020 08:51)  POCT Blood Glucose.: 165 mg/dL (29 Mar 2020 21:10)  POCT Blood Glucose.: 139 mg/dL (29 Mar 2020 18:28)  POCT Blood Glucose.: 212 mg/dL (29 Mar 2020 13:59)    I&O's Summary      PHYSICAL EXAM:  Vital Signs Last 24 Hrs  T(C): 36.8 (30 Mar 2020 12:39), Max: 38.9 (29 Mar 2020 21:24)  T(F): 98.3 (30 Mar 2020 12:39), Max: 102 (29 Mar 2020 21:24)  HR: 69 (30 Mar 2020 12:39) (69 - 86)  BP: 102/65 (30 Mar 2020 12:39) (102/65 - 131/71)  BP(mean): --  RR: 20 (30 Mar 2020 12:39) (20 - 20)  SpO2: 96% (30 Mar 2020 12:39) (89% - 96%)    CONSTITUTIONAL: NAD, well-developed, well-groomed  ENMT: Moist oral mucosa, no pharyngeal injection or exudates; normal dentition  RESPIRATORY: Poor inspiratory effort; mild crackles BL  CARDIOVASCULAR: Regular rate and rhythm, normal S1 and S2, no murmur/rub/gallop; No lower extremity edema; Peripheral pulses are 2+ bilaterally  ABDOMEN: Nontender to palpation, normoactive bowel sounds, no rebound/guarding; No hepatosplenomegaly  PSYCH: A+O to person, place, and time; affect appropriate  NEUROLOGY: CN 2-12 are intact and symmetric; no gross sensory deficits   SKIN: No rashes; no palpable lesions    LABS:                        10.9   5.78  )-----------( 315      ( 30 Mar 2020 07:22 )             35.5     03-30    137  |  99  |  17  ----------------------------<  111<H>  4.4   |  26  |  0.93    Ca    9.1      30 Mar 2020 07:22    TPro  7.0  /  Alb  3.1<L>  /  TBili  0.3  /  DBili  x   /  AST  55<H>  /  ALT  37  /  AlkPhos  77  03-30      CARDIAC MARKERS ( 30 Mar 2020 07:22 )  x     / x     / 150 U/L / x     / x          Urinalysis Basic - ( 28 Mar 2020 15:33 )    Color: Yellow / Appearance: Clear / S.029 / pH: x  Gluc: x / Ketone: Negative  / Bili: Negative / Urobili: 2 mg/dL   Blood: x / Protein: 300 mg/dL / Nitrite: Negative   Leuk Esterase: Negative / RBC: 2 /hpf / WBC 3 /HPF   Sq Epi: x / Non Sq Epi: 4 /hpf / Bacteria: Negative        Culture - Blood (collected 28 Mar 2020 03:27)  Source: .Blood Blood-Peripheral  Preliminary Report (29 Mar 2020 04:01):    No growth to date.    Culture - Blood (collected 28 Mar 2020 03:27)  Source: .Blood Blood-Peripheral  Preliminary Report (29 Mar 2020 04:01):    No growth to date.      COVID-19 PCR: Detected (20 @ 03:25)  COVID-19 PCR: NotDetec (20 @ 23:51)      RADIOLOGY & ADDITIONAL TESTS:  Imaging from Last 24 Hours:  Electrocardiogram/QTc Interval: Qtc 444, stable    COORDINATION OF CARE:  Care Discussed with Consultants/Other Providers:

## 2020-03-31 LAB
ALBUMIN SERPL ELPH-MCNC: 3 G/DL — LOW (ref 3.3–5)
ALP SERPL-CCNC: 86 U/L — SIGNIFICANT CHANGE UP (ref 40–120)
ALT FLD-CCNC: 51 U/L — HIGH (ref 10–45)
ANION GAP SERPL CALC-SCNC: 17 MMOL/L — SIGNIFICANT CHANGE UP (ref 5–17)
AST SERPL-CCNC: 77 U/L — HIGH (ref 10–40)
BASOPHILS # BLD AUTO: 0 K/UL — SIGNIFICANT CHANGE UP (ref 0–0.2)
BASOPHILS NFR BLD AUTO: 0 % — SIGNIFICANT CHANGE UP (ref 0–2)
BILIRUB SERPL-MCNC: 0.3 MG/DL — SIGNIFICANT CHANGE UP (ref 0.2–1.2)
BUN SERPL-MCNC: 19 MG/DL — SIGNIFICANT CHANGE UP (ref 7–23)
CALCIUM SERPL-MCNC: 9.2 MG/DL — SIGNIFICANT CHANGE UP (ref 8.4–10.5)
CHLORIDE SERPL-SCNC: 98 MMOL/L — SIGNIFICANT CHANGE UP (ref 96–108)
CO2 SERPL-SCNC: 22 MMOL/L — SIGNIFICANT CHANGE UP (ref 22–31)
CREAT SERPL-MCNC: 0.73 MG/DL — SIGNIFICANT CHANGE UP (ref 0.5–1.3)
EOSINOPHIL # BLD AUTO: 0 K/UL — SIGNIFICANT CHANGE UP (ref 0–0.5)
EOSINOPHIL NFR BLD AUTO: 0 % — SIGNIFICANT CHANGE UP (ref 0–6)
GLUCOSE BLDC GLUCOMTR-MCNC: 112 MG/DL — HIGH (ref 70–99)
GLUCOSE BLDC GLUCOMTR-MCNC: 114 MG/DL — HIGH (ref 70–99)
GLUCOSE BLDC GLUCOMTR-MCNC: 115 MG/DL — HIGH (ref 70–99)
GLUCOSE BLDC GLUCOMTR-MCNC: 128 MG/DL — HIGH (ref 70–99)
GLUCOSE BLDC GLUCOMTR-MCNC: 144 MG/DL — HIGH (ref 70–99)
GLUCOSE BLDC GLUCOMTR-MCNC: 176 MG/DL — HIGH (ref 70–99)
GLUCOSE SERPL-MCNC: 119 MG/DL — HIGH (ref 70–99)
HCT VFR BLD CALC: 34.6 % — SIGNIFICANT CHANGE UP (ref 34.5–45)
HGB BLD-MCNC: 11.4 G/DL — LOW (ref 11.5–15.5)
LYMPHOCYTES # BLD AUTO: 0.8 K/UL — LOW (ref 1–3.3)
LYMPHOCYTES # BLD AUTO: 12.5 % — LOW (ref 13–44)
MANUAL SMEAR VERIFICATION: SIGNIFICANT CHANGE UP
MCHC RBC-ENTMCNC: 26.6 PG — LOW (ref 27–34)
MCHC RBC-ENTMCNC: 32.9 GM/DL — SIGNIFICANT CHANGE UP (ref 32–36)
MCV RBC AUTO: 80.7 FL — SIGNIFICANT CHANGE UP (ref 80–100)
MONOCYTES # BLD AUTO: 0.32 K/UL — SIGNIFICANT CHANGE UP (ref 0–0.9)
MONOCYTES NFR BLD AUTO: 5 % — SIGNIFICANT CHANGE UP (ref 2–14)
NEUTROPHILS # BLD AUTO: 5.29 K/UL — SIGNIFICANT CHANGE UP (ref 1.8–7.4)
NEUTROPHILS NFR BLD AUTO: 82.5 % — HIGH (ref 43–77)
NRBC # BLD: 3 /100 — HIGH (ref 0–0)
PLAT MORPH BLD: ABNORMAL
PLATELET # BLD AUTO: 320 K/UL — SIGNIFICANT CHANGE UP (ref 150–400)
POIKILOCYTOSIS BLD QL AUTO: SLIGHT — SIGNIFICANT CHANGE UP
POTASSIUM SERPL-MCNC: 4.8 MMOL/L — SIGNIFICANT CHANGE UP (ref 3.5–5.3)
POTASSIUM SERPL-SCNC: 4.8 MMOL/L — SIGNIFICANT CHANGE UP (ref 3.5–5.3)
PROT SERPL-MCNC: 6.9 G/DL — SIGNIFICANT CHANGE UP (ref 6–8.3)
RBC # BLD: 4.29 M/UL — SIGNIFICANT CHANGE UP (ref 3.8–5.2)
RBC # FLD: 14.7 % — HIGH (ref 10.3–14.5)
RBC BLD AUTO: SIGNIFICANT CHANGE UP
SODIUM SERPL-SCNC: 137 MMOL/L — SIGNIFICANT CHANGE UP (ref 135–145)
WBC # BLD: 6.41 K/UL — SIGNIFICANT CHANGE UP (ref 3.8–10.5)
WBC # FLD AUTO: 6.41 K/UL — SIGNIFICANT CHANGE UP (ref 3.8–10.5)

## 2020-03-31 PROCEDURE — 99233 SBSQ HOSP IP/OBS HIGH 50: CPT

## 2020-03-31 PROCEDURE — 93010 ELECTROCARDIOGRAM REPORT: CPT

## 2020-03-31 RX ADMIN — ZINC SULFATE TAB 220 MG (50 MG ZINC EQUIVALENT) 220 MILLIGRAM(S): 220 (50 ZN) TAB at 12:18

## 2020-03-31 RX ADMIN — Medication 650 MILLIGRAM(S): at 16:00

## 2020-03-31 RX ADMIN — Medication 100 MILLIGRAM(S): at 22:03

## 2020-03-31 RX ADMIN — Medication 200 MILLIGRAM(S): at 04:28

## 2020-03-31 RX ADMIN — PANTOPRAZOLE SODIUM 40 MILLIGRAM(S): 20 TABLET, DELAYED RELEASE ORAL at 04:28

## 2020-03-31 RX ADMIN — Medication 600 MILLIGRAM(S): at 18:05

## 2020-03-31 RX ADMIN — Medication 100 MILLIGRAM(S): at 12:18

## 2020-03-31 RX ADMIN — SERTRALINE 50 MILLIGRAM(S): 25 TABLET, FILM COATED ORAL at 12:18

## 2020-03-31 RX ADMIN — AMLODIPINE BESYLATE 10 MILLIGRAM(S): 2.5 TABLET ORAL at 04:27

## 2020-03-31 RX ADMIN — ENOXAPARIN SODIUM 40 MILLIGRAM(S): 100 INJECTION SUBCUTANEOUS at 12:18

## 2020-03-31 RX ADMIN — Medication 100 MILLIGRAM(S): at 04:28

## 2020-03-31 RX ADMIN — Medication 200 MILLIGRAM(S): at 18:06

## 2020-03-31 RX ADMIN — CARVEDILOL PHOSPHATE 25 MILLIGRAM(S): 80 CAPSULE, EXTENDED RELEASE ORAL at 18:05

## 2020-03-31 RX ADMIN — Medication 650 MILLIGRAM(S): at 04:33

## 2020-03-31 RX ADMIN — Medication 500 MILLIGRAM(S): at 12:17

## 2020-03-31 RX ADMIN — Medication 100 MILLIGRAM(S): at 12:17

## 2020-03-31 RX ADMIN — Medication 600 MILLIGRAM(S): at 04:28

## 2020-03-31 RX ADMIN — ATORVASTATIN CALCIUM 20 MILLIGRAM(S): 80 TABLET, FILM COATED ORAL at 22:03

## 2020-03-31 RX ADMIN — CARVEDILOL PHOSPHATE 25 MILLIGRAM(S): 80 CAPSULE, EXTENDED RELEASE ORAL at 04:28

## 2020-03-31 NOTE — DIETITIAN INITIAL EVALUATION ADULT. - PROBLEM SELECTOR PLAN 1
Patient with hypoxic respiratory failure, likely 2/2 to COVID; can't r/o superimposed bacterial pneumonia  c/w oxygen  c/w other symptomatic management

## 2020-03-31 NOTE — PROGRESS NOTE ADULT - SUBJECTIVE AND OBJECTIVE BOX
[facility] Division of Hospital Medicine Progress Note    Patient is a 74y old  Female who presents with a chief complaint of cough and SOB (30 Mar 2020 13:26)      SUBJECTIVE / OVERNIGHT EVENTS:  Patient feels more comfortable today. She states that she is still having cough. She had some mild abdominal pain o/n but resolved this morning.   ADDITIONAL REVIEW OF SYSTEMS:  No CP    MEDICATIONS  (STANDING):  amLODIPine   Tablet 10 milliGRAM(s) Oral daily  ascorbic acid 500 milliGRAM(s) Oral daily  atorvastatin 20 milliGRAM(s) Oral at bedtime  benzonatate 100 milliGRAM(s) Oral every 8 hours  carvedilol 25 milliGRAM(s) Oral every 12 hours  dextrose 5%. 1000 milliLiter(s) (50 mL/Hr) IV Continuous <Continuous>  dextrose 50% Injectable 12.5 Gram(s) IV Push once  dextrose 50% Injectable 25 Gram(s) IV Push once  dextrose 50% Injectable 25 Gram(s) IV Push once  enoxaparin Injectable 40 milliGRAM(s) SubCutaneous daily  guaiFENesin  milliGRAM(s) Oral every 12 hours  hydroxychloroquine 200 milliGRAM(s) Oral every 12 hours  hydroxychloroquine   Oral   insulin lispro (HumaLOG) corrective regimen sliding scale   SubCutaneous three times a day before meals  insulin lispro (HumaLOG) corrective regimen sliding scale   SubCutaneous at bedtime  pantoprazole    Tablet 40 milliGRAM(s) Oral before breakfast  sertraline 50 milliGRAM(s) Oral daily  thiamine 100 milliGRAM(s) Oral daily  zinc sulfate 220 milliGRAM(s) Oral daily    MEDICATIONS  (PRN):  acetaminophen   Tablet .. 650 milliGRAM(s) Oral every 6 hours PRN Temp greater or equal to 38C (100.4F), Mild Pain (1 - 3)  dextrose 40% Gel 15 Gram(s) Oral once PRN Blood Glucose LESS THAN 70 milliGRAM(s)/deciliter  glucagon  Injectable 1 milliGRAM(s) IntraMuscular once PRN Glucose LESS THAN 70 milligrams/deciliter      CAPILLARY BLOOD GLUCOSE      POCT Blood Glucose.: 114 mg/dL (31 Mar 2020 11:26)  POCT Blood Glucose.: 112 mg/dL (31 Mar 2020 09:30)  POCT Blood Glucose.: 128 mg/dL (31 Mar 2020 00:17)  POCT Blood Glucose.: 115 mg/dL (30 Mar 2020 22:21)  POCT Blood Glucose.: 154 mg/dL (30 Mar 2020 18:20)    I&O's Summary    31 Mar 2020 07:01  -  31 Mar 2020 15:29  --------------------------------------------------------  IN: 0 mL / OUT: 500 mL / NET: -500 mL        PHYSICAL EXAM:  Vital Signs Last 24 Hrs  T(C): 37.3 (31 Mar 2020 13:05), Max: 37.4 (31 Mar 2020 04:38)  T(F): 99.1 (31 Mar 2020 13:05), Max: 99.4 (31 Mar 2020 04:38)  HR: 75 (31 Mar 2020 13:05) (74 - 101)  BP: 137/76 (31 Mar 2020 13:05) (121/80 - 139/82)  BP(mean): --  RR: 20 (31 Mar 2020 13:05) (20 - 20)  SpO2: 95% (31 Mar 2020 13:05) (95% - 95%)    CONSTITUTIONAL: NAD, well-developed, well-groomed  ENMT: Moist oral mucosa, no pharyngeal injection or exudates; normal dentition  RESPIRATORY: Normal respiratory effort; decreased BS BL  CARDIOVASCULAR: Regular rate and rhythm, normal S1 and S2, no murmur/rub/gallop; No lower extremity edema; Peripheral pulses are 2+ bilaterally  ABDOMEN: Nontender to palpation, normoactive bowel sounds, no rebound/guarding; No hepatosplenomegaly  PSYCH: A+O to person, place, and time; affect appropriate  NEUROLOGY: CN 2-12 are intact and symmetric; no gross sensory deficits   SKIN: No rashes; no palpable lesions    LABS:                        11.4   6.41  )-----------( 320      ( 31 Mar 2020 06:43 )             34.6     03-31    137  |  98  |  19  ----------------------------<  119<H>  4.8   |  22  |  0.73    Ca    9.2      31 Mar 2020 06:41    TPro  6.9  /  Alb  3.0<L>  /  TBili  0.3  /  DBili  x   /  AST  77<H>  /  ALT  51<H>  /  AlkPhos  86  03-31      CARDIAC MARKERS ( 30 Mar 2020 07:22 )  x     / x     / 150 U/L / x     / x              COVID-19 PCR: Detected (03-28-20 @ 03:25)  COVID-19 PCR: NotDetec (03-27-20 @ 23:51)      RADIOLOGY & ADDITIONAL TESTS:  Imaging from Last 24 Hours:  Electrocardiogram/QTc Interval:    COORDINATION OF CARE:  Care Discussed with Consultants/Other Providers: [facility] Division of Hospital Medicine Progress Note    Patient is a 74y old  Female who presents with a chief complaint of cough and SOB (30 Mar 2020 13:26)      SUBJECTIVE / OVERNIGHT EVENTS:  Patient feels more comfortable today. She states that she is still having cough. She had some mild abdominal pain o/n but resolved this morning.   ADDITIONAL REVIEW OF SYSTEMS:  No CP    MEDICATIONS  (STANDING):  amLODIPine   Tablet 10 milliGRAM(s) Oral daily  ascorbic acid 500 milliGRAM(s) Oral daily  atorvastatin 20 milliGRAM(s) Oral at bedtime  benzonatate 100 milliGRAM(s) Oral every 8 hours  carvedilol 25 milliGRAM(s) Oral every 12 hours  dextrose 5%. 1000 milliLiter(s) (50 mL/Hr) IV Continuous <Continuous>  dextrose 50% Injectable 12.5 Gram(s) IV Push once  dextrose 50% Injectable 25 Gram(s) IV Push once  dextrose 50% Injectable 25 Gram(s) IV Push once  enoxaparin Injectable 40 milliGRAM(s) SubCutaneous daily  guaiFENesin  milliGRAM(s) Oral every 12 hours  hydroxychloroquine 200 milliGRAM(s) Oral every 12 hours  hydroxychloroquine   Oral   insulin lispro (HumaLOG) corrective regimen sliding scale   SubCutaneous three times a day before meals  insulin lispro (HumaLOG) corrective regimen sliding scale   SubCutaneous at bedtime  pantoprazole    Tablet 40 milliGRAM(s) Oral before breakfast  sertraline 50 milliGRAM(s) Oral daily  thiamine 100 milliGRAM(s) Oral daily  zinc sulfate 220 milliGRAM(s) Oral daily    MEDICATIONS  (PRN):  acetaminophen   Tablet .. 650 milliGRAM(s) Oral every 6 hours PRN Temp greater or equal to 38C (100.4F), Mild Pain (1 - 3)  dextrose 40% Gel 15 Gram(s) Oral once PRN Blood Glucose LESS THAN 70 milliGRAM(s)/deciliter  glucagon  Injectable 1 milliGRAM(s) IntraMuscular once PRN Glucose LESS THAN 70 milligrams/deciliter      CAPILLARY BLOOD GLUCOSE      POCT Blood Glucose.: 114 mg/dL (31 Mar 2020 11:26)  POCT Blood Glucose.: 112 mg/dL (31 Mar 2020 09:30)  POCT Blood Glucose.: 128 mg/dL (31 Mar 2020 00:17)  POCT Blood Glucose.: 115 mg/dL (30 Mar 2020 22:21)  POCT Blood Glucose.: 154 mg/dL (30 Mar 2020 18:20)    I&O's Summary    31 Mar 2020 07:01  -  31 Mar 2020 15:29  --------------------------------------------------------  IN: 0 mL / OUT: 500 mL / NET: -500 mL        PHYSICAL EXAM:  Vital Signs Last 24 Hrs  T(C): 37.3 (31 Mar 2020 13:05), Max: 37.4 (31 Mar 2020 04:38)  T(F): 99.1 (31 Mar 2020 13:05), Max: 99.4 (31 Mar 2020 04:38)  HR: 75 (31 Mar 2020 13:05) (74 - 101)  BP: 137/76 (31 Mar 2020 13:05) (121/80 - 139/82)  BP(mean): --  RR: 20 (31 Mar 2020 13:05) (20 - 20)  SpO2: 95% (31 Mar 2020 13:05) (95% - 95%)    CONSTITUTIONAL: NAD, well-developed, well-groomed  ENMT: Moist oral mucosa, no pharyngeal injection or exudates; normal dentition  RESPIRATORY: Normal respiratory effort; decreased BS BL  CARDIOVASCULAR: Regular rate and rhythm, normal S1 and S2, no murmur/rub/gallop; No lower extremity edema; Peripheral pulses are 2+ bilaterally  ABDOMEN: Nontender to palpation, normoactive bowel sounds, no rebound/guarding; No hepatosplenomegaly  PSYCH: A+O to person, place, and time; affect appropriate  NEUROLOGY: CN 2-12 are intact and symmetric; no gross sensory deficits   SKIN: No rashes; no palpable lesions    LABS:                        11.4   6.41  )-----------( 320      ( 31 Mar 2020 06:43 )             34.6     03-31    137  |  98  |  19  ----------------------------<  119<H>  4.8   |  22  |  0.73    Ca    9.2      31 Mar 2020 06:41    TPro  6.9  /  Alb  3.0<L>  /  TBili  0.3  /  DBili  x   /  AST  77<H>  /  ALT  51<H>  /  AlkPhos  86  03-31      CARDIAC MARKERS ( 30 Mar 2020 07:22 )  x     / x     / 150 U/L / x     / x              COVID-19 PCR: Detected (03-28-20 @ 03:25)  COVID-19 PCR: NotDetec (03-27-20 @ 23:51)      RADIOLOGY & ADDITIONAL TESTS:  Imaging from Last 24 Hours:  Electrocardiogram/QTc Interval: Qtc 457 today    COORDINATION OF CARE:  Care Discussed with Consultants/Other Providers:

## 2020-03-31 NOTE — DIETITIAN INITIAL EVALUATION ADULT. - OTHER INFO
Due to COVID-19 isolation policy, pt contacted via telephone. Pt is primarily [place] Creole Speaking, accepted free  services  [facility] ID#[id number]. Pt reports poor po intake/appetite at this time only picking at meals over the past ~2 weeks. Prior to this pt reports good po intake/appetite with typical meals consisting of 2 slices of bread and banana at breakfast, rice and vegetables with fish for lunch and dinner. Takes a potassium supplement PTA. She denies N/V/constipation or diarrhea at this time, says BM have been soft. Pt feels she has lost weight, states UBW was 212 pounds, current weight per bedscale 190 pounds. In terms of DM management PTA, pt reports taking Metformin and Janumet, owns glucometer, and reports recent HbA1c of 6.6%. Endorses allergy to avocado, cantaloupe, watermelon, pineapple, cucumber, and tomato resulting in vomiting. Due to COVID-19 isolation policy, pt contacted via telephone. Pt is primarily [place] Creole Speaking, accepted free  services  [facility] ID#[id number]. Pt reports poor po intake/appetite at this time only picking at meals over the past ~2 weeks. Prior to this pt reports good po intake/appetite with typical meals consisting of 2 slices of bread and banana at breakfast, rice and vegetables with fish for lunch and dinner. Takes a potassium supplement PTA. She denies N/V/constipation or diarrhea at this time, says BM have been soft. Pt feels she has lost weight, states UBW was 212 pounds "a long time ago", current weight per bedscale 190 pounds. In terms of DM management PTA, pt reports taking Metformin and Janumet, owns glucometer, and reports recent HbA1c of 6.6%. Endorses allergy to avocado, cantaloupe, watermelon, pineapple, cucumber, and tomato resulting in vomiting. Due to COVID-19 isolation policy, pt contacted via telephone. Pt is primarily [place]cu[place] Creole Speaking, accepted free  services  [facility] ID#[id number]. Pt reports poor po intake/appetite at this time, only picking at meals over the past ~2 weeks. States she prefers soft food as it is easier to swallow while she is feeling ill. Prior to this pt reports good po intake/appetite with typical meals consisting of 2 slices of bread and banana at breakfast, rice and vegetables with fish for lunch and dinner. Takes a potassium supplement PTA. She denies N/V/constipation or diarrhea at this time, says BM have been soft. Pt feels she has lost weight, states UBW was 212 pounds "a long time ago", current weight per bedscale 190 pounds. In terms of DM management PTA, pt reports taking Metformin and Janumet, owns glucometer, and reports recent HbA1c of 6.6%. Endorses allergy to avocado, cantaloupe, watermelon, pineapple, cucumber, and tomato resulting in vomiting.

## 2020-03-31 NOTE — DIETITIAN INITIAL EVALUATION ADULT. - PROBLEM SELECTOR PLAN 2
Patient with high suspicion for COVID, initial test was negative, pending repeat testing; can't rule out superimposed bacterial pneumonia  hold off antibiotics at this time  - Continue with supplemental oxygen with goal SpO2>92, if requires escalate to NRB and avoid BiLevel/High Flow Nasal Cannula per institution policy given risk of aerosolization  - If COVID positive, Start Hydroxychloroquine 400mg x2 doses and then 200mg BID for 4 days. The Benefits/Risks/Alternative discussed with next of kin in agreement with therapy although not currently FDA-approved for the treatment of COVID19  check ESR, CRP, Procalcitonin  - monitor EKG for QTc prolongation  - guaifenessin/tessalon perles/ acetaminophen prn  - isolation precautions  - f/u blood culture

## 2020-03-31 NOTE — PROVIDER CONTACT NOTE (OTHER) - SITUATION
Pt's oxygen saturation on 6L of O2 via nasal cannula 87%. pt encouraged to take deep breaths, O2 increased up to 9L. Pt c/o SOB.

## 2020-03-31 NOTE — PROVIDER CONTACT NOTE (OTHER) - ASSESSMENT
axox4. vss. O2 sat 87% on 6L O2. Spoke with PA can increase O2 up to 10L. Pt increased to 9L and sat is 95%. Pt took deep breaths and states she feels better.

## 2020-03-31 NOTE — DIETITIAN INITIAL EVALUATION ADULT. - ADD RECOMMEND
Recommend continue current consistent CHO, DASH diet to promote glucose control and heart health. Will add LYN Mightyshake 4oz. with meals for additional 600 kcal, 21 grams protein to supplement meals. Continue vitamin C, thiamine, and zinc supplements. Pt's food preferences obtained and honored on menu. Recommend soft diet per pt preference. Continue current consistent CHO, DASH diet to promote glucose control and heart health. Will add LYN Mightyshake 4oz. with meals for additional 600 kcal, 21 grams protein to supplement meals. Continue vitamin C, thiamine, and zinc supplements. Pt's food preferences obtained and honored on menu.

## 2020-03-31 NOTE — DIETITIAN INITIAL EVALUATION ADULT. - PERTINENT LABORATORY DATA
3/31: Glucose: 119.  CAPILLARY BLOOD GLUCOSE  POCT Blood Glucose.: 128 mg/dL (31 Mar 2020 00:17)  POCT Blood Glucose.: 115 mg/dL (30 Mar 2020 22:21)  POCT Blood Glucose.: 154 mg/dL (30 Mar 2020 18:20)  POCT Blood Glucose.: 121 mg/dL (30 Mar 2020 13:22)

## 2020-03-31 NOTE — DIETITIAN INITIAL EVALUATION ADULT. - ENERGY NEEDS
ht: 62 inches. wt: 189.5 pounds (current as of 3/29, bedscale, no edema noted). BMI: 34.7 kG/m2. UBW: 212 pounds. IBW: 110 pounds +/- 10%. %IBW: 172%.  Other pertinent objective information: Pt is a 74 year old ht: 62 inches. wt: 189.5 pounds (current as of 3/29, bedscale, no edema noted). BMI: 34.7 kG/m2. UBW: 212 pounds. IBW: 110 pounds +/- 10%. %IBW: 172%.  Other pertinent objective information: Pt is a 74 year old female with PMH HTN, HLD, T2DM, Arthritis, Aortic insufficiency s/p AVR (bioprosthetic), not on AC, presents here with SOB and cough. Found to be COVID 19 (+). Skin: No pressure injuries per flowsheet records.

## 2020-03-31 NOTE — CHART NOTE - NSCHARTNOTEFT_GEN_A_CORE
Upon Nutritional Assessment by the Registered Dietitian your patient was determined to meet criteria / has evidence of the following diagnosis/diagnoses:          [x]  Mild Protein Calorie Malnutrition    Findings as based on:  [x] Comprehensive nutrition assessment   [ ] Nutrition Focused Physical Exam  [x] Other: wt loss, poor po       Nutrition Plan/Recommendations:      Please see RD initial assessment for recommendations and interventions    PROVIDER Section:     By signing this assessment you are acknowledging and agree with the diagnosis/diagnoses assigned by the Registered Dietitian    Comments:

## 2020-04-01 LAB
ALBUMIN SERPL ELPH-MCNC: 3.2 G/DL — LOW (ref 3.3–5)
ALP SERPL-CCNC: 115 U/L — SIGNIFICANT CHANGE UP (ref 40–120)
ALT FLD-CCNC: 48 U/L — HIGH (ref 10–45)
ANION GAP SERPL CALC-SCNC: 12 MMOL/L — SIGNIFICANT CHANGE UP (ref 5–17)
AST SERPL-CCNC: 50 U/L — HIGH (ref 10–40)
BASOPHILS # BLD AUTO: 0.02 K/UL — SIGNIFICANT CHANGE UP (ref 0–0.2)
BASOPHILS NFR BLD AUTO: 0.3 % — SIGNIFICANT CHANGE UP (ref 0–2)
BILIRUB DIRECT SERPL-MCNC: 0.2 MG/DL — SIGNIFICANT CHANGE UP (ref 0–0.2)
BILIRUB INDIRECT FLD-MCNC: 0.3 MG/DL — SIGNIFICANT CHANGE UP (ref 0.2–1)
BILIRUB SERPL-MCNC: 0.5 MG/DL — SIGNIFICANT CHANGE UP (ref 0.2–1.2)
BUN SERPL-MCNC: 14 MG/DL — SIGNIFICANT CHANGE UP (ref 7–23)
CALCIUM SERPL-MCNC: 9.5 MG/DL — SIGNIFICANT CHANGE UP (ref 8.4–10.5)
CHLORIDE SERPL-SCNC: 100 MMOL/L — SIGNIFICANT CHANGE UP (ref 96–108)
CO2 SERPL-SCNC: 26 MMOL/L — SIGNIFICANT CHANGE UP (ref 22–31)
CREAT SERPL-MCNC: 0.69 MG/DL — SIGNIFICANT CHANGE UP (ref 0.5–1.3)
CULTURE RESULTS: SIGNIFICANT CHANGE UP
CULTURE RESULTS: SIGNIFICANT CHANGE UP
EOSINOPHIL # BLD AUTO: 0.06 K/UL — SIGNIFICANT CHANGE UP (ref 0–0.5)
EOSINOPHIL NFR BLD AUTO: 0.9 % — SIGNIFICANT CHANGE UP (ref 0–6)
GLUCOSE BLDC GLUCOMTR-MCNC: 102 MG/DL — HIGH (ref 70–99)
GLUCOSE BLDC GLUCOMTR-MCNC: 114 MG/DL — HIGH (ref 70–99)
GLUCOSE BLDC GLUCOMTR-MCNC: 127 MG/DL — HIGH (ref 70–99)
GLUCOSE BLDC GLUCOMTR-MCNC: 133 MG/DL — HIGH (ref 70–99)
GLUCOSE SERPL-MCNC: 113 MG/DL — HIGH (ref 70–99)
HCT VFR BLD CALC: 35.7 % — SIGNIFICANT CHANGE UP (ref 34.5–45)
HGB BLD-MCNC: 11.2 G/DL — LOW (ref 11.5–15.5)
IMM GRANULOCYTES NFR BLD AUTO: 0.9 % — SIGNIFICANT CHANGE UP (ref 0–1.5)
LYMPHOCYTES # BLD AUTO: 1.07 K/UL — SIGNIFICANT CHANGE UP (ref 1–3.3)
LYMPHOCYTES # BLD AUTO: 16.9 % — SIGNIFICANT CHANGE UP (ref 13–44)
MCHC RBC-ENTMCNC: 25.1 PG — LOW (ref 27–34)
MCHC RBC-ENTMCNC: 31.4 GM/DL — LOW (ref 32–36)
MCV RBC AUTO: 80 FL — SIGNIFICANT CHANGE UP (ref 80–100)
MONOCYTES # BLD AUTO: 0.26 K/UL — SIGNIFICANT CHANGE UP (ref 0–0.9)
MONOCYTES NFR BLD AUTO: 4.1 % — SIGNIFICANT CHANGE UP (ref 2–14)
NEUTROPHILS # BLD AUTO: 4.85 K/UL — SIGNIFICANT CHANGE UP (ref 1.8–7.4)
NEUTROPHILS NFR BLD AUTO: 76.9 % — SIGNIFICANT CHANGE UP (ref 43–77)
NRBC # BLD: 0 /100 WBCS — SIGNIFICANT CHANGE UP (ref 0–0)
PLATELET # BLD AUTO: 433 K/UL — HIGH (ref 150–400)
POTASSIUM SERPL-MCNC: 4.2 MMOL/L — SIGNIFICANT CHANGE UP (ref 3.5–5.3)
POTASSIUM SERPL-SCNC: 4.2 MMOL/L — SIGNIFICANT CHANGE UP (ref 3.5–5.3)
PROT SERPL-MCNC: 7.1 G/DL — SIGNIFICANT CHANGE UP (ref 6–8.3)
RBC # BLD: 4.46 M/UL — SIGNIFICANT CHANGE UP (ref 3.8–5.2)
RBC # FLD: 14.3 % — SIGNIFICANT CHANGE UP (ref 10.3–14.5)
SODIUM SERPL-SCNC: 138 MMOL/L — SIGNIFICANT CHANGE UP (ref 135–145)
SPECIMEN SOURCE: SIGNIFICANT CHANGE UP
SPECIMEN SOURCE: SIGNIFICANT CHANGE UP
WBC # BLD: 6.32 K/UL — SIGNIFICANT CHANGE UP (ref 3.8–10.5)
WBC # FLD AUTO: 6.32 K/UL — SIGNIFICANT CHANGE UP (ref 3.8–10.5)

## 2020-04-01 PROCEDURE — 99233 SBSQ HOSP IP/OBS HIGH 50: CPT

## 2020-04-01 RX ORDER — ALBUTEROL 90 UG/1
2 AEROSOL, METERED ORAL EVERY 6 HOURS
Refills: 0 | Status: DISCONTINUED | OUTPATIENT
Start: 2020-04-01 | End: 2020-04-08

## 2020-04-01 RX ADMIN — Medication 100 MILLIGRAM(S): at 13:12

## 2020-04-01 RX ADMIN — CARVEDILOL PHOSPHATE 25 MILLIGRAM(S): 80 CAPSULE, EXTENDED RELEASE ORAL at 05:53

## 2020-04-01 RX ADMIN — CARVEDILOL PHOSPHATE 25 MILLIGRAM(S): 80 CAPSULE, EXTENDED RELEASE ORAL at 17:27

## 2020-04-01 RX ADMIN — ENOXAPARIN SODIUM 40 MILLIGRAM(S): 100 INJECTION SUBCUTANEOUS at 13:12

## 2020-04-01 RX ADMIN — Medication 600 MILLIGRAM(S): at 06:53

## 2020-04-01 RX ADMIN — AMLODIPINE BESYLATE 10 MILLIGRAM(S): 2.5 TABLET ORAL at 05:54

## 2020-04-01 RX ADMIN — Medication 100 MILLIGRAM(S): at 05:53

## 2020-04-01 RX ADMIN — Medication 100 MILLIGRAM(S): at 13:13

## 2020-04-01 RX ADMIN — ATORVASTATIN CALCIUM 20 MILLIGRAM(S): 80 TABLET, FILM COATED ORAL at 22:45

## 2020-04-01 RX ADMIN — Medication 200 MILLIGRAM(S): at 17:27

## 2020-04-01 RX ADMIN — Medication 600 MILLIGRAM(S): at 17:27

## 2020-04-01 RX ADMIN — Medication 100 MILLIGRAM(S): at 22:46

## 2020-04-01 RX ADMIN — ZINC SULFATE TAB 220 MG (50 MG ZINC EQUIVALENT) 220 MILLIGRAM(S): 220 (50 ZN) TAB at 13:13

## 2020-04-01 RX ADMIN — Medication 200 MILLIGRAM(S): at 05:53

## 2020-04-01 RX ADMIN — Medication 30 MILLILITER(S): at 15:56

## 2020-04-01 RX ADMIN — PANTOPRAZOLE SODIUM 40 MILLIGRAM(S): 20 TABLET, DELAYED RELEASE ORAL at 05:53

## 2020-04-01 RX ADMIN — Medication 650 MILLIGRAM(S): at 10:17

## 2020-04-01 RX ADMIN — Medication 500 MILLIGRAM(S): at 13:12

## 2020-04-01 RX ADMIN — SERTRALINE 50 MILLIGRAM(S): 25 TABLET, FILM COATED ORAL at 13:12

## 2020-04-01 NOTE — PROGRESS NOTE ADULT - SUBJECTIVE AND OBJECTIVE BOX
[facility] Division of Hospital Medicine Progress Note    Patient is a 74y old  Female who presents with a chief complaint of cough and SOB (31 Mar 2020 15:29)      SUBJECTIVE / OVERNIGHT EVENTS:  ADDITIONAL REVIEW OF SYSTEMS:    MEDICATIONS  (STANDING):  amLODIPine   Tablet 10 milliGRAM(s) Oral daily  ascorbic acid 500 milliGRAM(s) Oral daily  atorvastatin 20 milliGRAM(s) Oral at bedtime  benzonatate 100 milliGRAM(s) Oral every 8 hours  carvedilol 25 milliGRAM(s) Oral every 12 hours  dextrose 5%. 1000 milliLiter(s) (50 mL/Hr) IV Continuous <Continuous>  dextrose 50% Injectable 12.5 Gram(s) IV Push once  dextrose 50% Injectable 25 Gram(s) IV Push once  dextrose 50% Injectable 25 Gram(s) IV Push once  enoxaparin Injectable 40 milliGRAM(s) SubCutaneous daily  guaiFENesin  milliGRAM(s) Oral every 12 hours  hydroxychloroquine 200 milliGRAM(s) Oral every 12 hours  hydroxychloroquine   Oral   insulin lispro (HumaLOG) corrective regimen sliding scale   SubCutaneous three times a day before meals  insulin lispro (HumaLOG) corrective regimen sliding scale   SubCutaneous at bedtime  pantoprazole    Tablet 40 milliGRAM(s) Oral before breakfast  sertraline 50 milliGRAM(s) Oral daily  thiamine 100 milliGRAM(s) Oral daily  zinc sulfate 220 milliGRAM(s) Oral daily    MEDICATIONS  (PRN):  acetaminophen   Tablet .. 650 milliGRAM(s) Oral every 6 hours PRN Temp greater or equal to 38C (100.4F), Mild Pain (1 - 3)  dextrose 40% Gel 15 Gram(s) Oral once PRN Blood Glucose LESS THAN 70 milliGRAM(s)/deciliter  glucagon  Injectable 1 milliGRAM(s) IntraMuscular once PRN Glucose LESS THAN 70 milligrams/deciliter      CAPILLARY BLOOD GLUCOSE      POCT Blood Glucose.: 114 mg/dL (01 Apr 2020 09:21)  POCT Blood Glucose.: 176 mg/dL (31 Mar 2020 22:00)  POCT Blood Glucose.: 144 mg/dL (31 Mar 2020 17:38)    I&O's Summary    31 Mar 2020 07:01  -  01 Apr 2020 07:00  --------------------------------------------------------  IN: 0 mL / OUT: 2000 mL / NET: -2000 mL        PHYSICAL EXAM:  Vital Signs Last 24 Hrs  T(C): 37.4 (01 Apr 2020 06:11), Max: 37.4 (01 Apr 2020 06:11)  T(F): 99.4 (01 Apr 2020 06:11), Max: 99.4 (01 Apr 2020 06:11)  HR: 84 (01 Apr 2020 06:11) (75 - 84)  BP: 150/76 (01 Apr 2020 06:11) (125/76 - 150/76)  BP(mean): --  RR: 20 (01 Apr 2020 06:11) (20 - 20)  SpO2: 91% (01 Apr 2020 06:11) (91% - 95%)  CONSTITUTIONAL: NAD, well-developed, well-groomed  ENMT: Moist oral mucosa, no pharyngeal injection or exudates; normal dentition  RESPIRATORY: Normal respiratory effort; lungs are clear to auscultation bilaterally  CARDIOVASCULAR: Regular rate and rhythm, normal S1 and S2, no murmur/rub/gallop; No lower extremity edema; Peripheral pulses are 2+ bilaterally  ABDOMEN: Nontender to palpation, normoactive bowel sounds, no rebound/guarding; No hepatosplenomegaly  PSYCH: A+O to person, place, and time; affect appropriate  NEUROLOGY: CN 2-12 are intact and symmetric; no gross sensory deficits   SKIN: No rashes; no palpable lesions    LABS:                        11.2   6.32  )-----------( 433      ( 01 Apr 2020 10:03 )             35.7     04-01    138  |  100  |  14  ----------------------------<  113<H>  4.2   |  26  |  0.69    Ca    9.5      01 Apr 2020 10:03    TPro  7.1  /  Alb  3.2<L>  /  TBili  0.5  /  DBili  0.2  /  AST  50<H>  /  ALT  48<H>  /  AlkPhos  115  04-01              COVID-19 PCR: Detected (03-28-20 @ 03:25)  COVID-19 PCR: NotDetec (03-27-20 @ 23:51)      RADIOLOGY & ADDITIONAL TESTS:  Imaging from Last 24 Hours:  Electrocardiogram/QTc Interval:    COORDINATION OF CARE:  Care Discussed with Consultants/Other Providers:

## 2020-04-02 LAB
ALBUMIN SERPL ELPH-MCNC: 3.2 G/DL — LOW (ref 3.3–5)
ALP SERPL-CCNC: 128 U/L — HIGH (ref 40–120)
ALT FLD-CCNC: 47 U/L — HIGH (ref 10–45)
ANION GAP SERPL CALC-SCNC: 14 MMOL/L — SIGNIFICANT CHANGE UP (ref 5–17)
AST SERPL-CCNC: 43 U/L — HIGH (ref 10–40)
BILIRUB SERPL-MCNC: 0.5 MG/DL — SIGNIFICANT CHANGE UP (ref 0.2–1.2)
BUN SERPL-MCNC: 17 MG/DL — SIGNIFICANT CHANGE UP (ref 7–23)
CALCIUM SERPL-MCNC: 9.8 MG/DL — SIGNIFICANT CHANGE UP (ref 8.4–10.5)
CHLORIDE SERPL-SCNC: 98 MMOL/L — SIGNIFICANT CHANGE UP (ref 96–108)
CO2 SERPL-SCNC: 26 MMOL/L — SIGNIFICANT CHANGE UP (ref 22–31)
CREAT SERPL-MCNC: 0.69 MG/DL — SIGNIFICANT CHANGE UP (ref 0.5–1.3)
GLUCOSE BLDC GLUCOMTR-MCNC: 124 MG/DL — HIGH (ref 70–99)
GLUCOSE BLDC GLUCOMTR-MCNC: 132 MG/DL — HIGH (ref 70–99)
GLUCOSE BLDC GLUCOMTR-MCNC: 146 MG/DL — HIGH (ref 70–99)
GLUCOSE BLDC GLUCOMTR-MCNC: 164 MG/DL — HIGH (ref 70–99)
GLUCOSE SERPL-MCNC: 131 MG/DL — HIGH (ref 70–99)
HCT VFR BLD CALC: 35.9 % — SIGNIFICANT CHANGE UP (ref 34.5–45)
HGB BLD-MCNC: 11.4 G/DL — LOW (ref 11.5–15.5)
MCHC RBC-ENTMCNC: 25.4 PG — LOW (ref 27–34)
MCHC RBC-ENTMCNC: 31.8 GM/DL — LOW (ref 32–36)
MCV RBC AUTO: 80 FL — SIGNIFICANT CHANGE UP (ref 80–100)
NRBC # BLD: 0 /100 WBCS — SIGNIFICANT CHANGE UP (ref 0–0)
PLATELET # BLD AUTO: 498 K/UL — HIGH (ref 150–400)
POTASSIUM SERPL-MCNC: 4.4 MMOL/L — SIGNIFICANT CHANGE UP (ref 3.5–5.3)
POTASSIUM SERPL-SCNC: 4.4 MMOL/L — SIGNIFICANT CHANGE UP (ref 3.5–5.3)
PROT SERPL-MCNC: 7.3 G/DL — SIGNIFICANT CHANGE UP (ref 6–8.3)
RBC # BLD: 4.49 M/UL — SIGNIFICANT CHANGE UP (ref 3.8–5.2)
RBC # FLD: 14.4 % — SIGNIFICANT CHANGE UP (ref 10.3–14.5)
SODIUM SERPL-SCNC: 138 MMOL/L — SIGNIFICANT CHANGE UP (ref 135–145)
WBC # BLD: 8.16 K/UL — SIGNIFICANT CHANGE UP (ref 3.8–10.5)
WBC # FLD AUTO: 8.16 K/UL — SIGNIFICANT CHANGE UP (ref 3.8–10.5)

## 2020-04-02 PROCEDURE — 99233 SBSQ HOSP IP/OBS HIGH 50: CPT

## 2020-04-02 RX ORDER — ONDANSETRON 8 MG/1
4 TABLET, FILM COATED ORAL ONCE
Refills: 0 | Status: COMPLETED | OUTPATIENT
Start: 2020-04-02 | End: 2020-04-02

## 2020-04-02 RX ADMIN — Medication 100 MILLIGRAM(S): at 20:39

## 2020-04-02 RX ADMIN — CARVEDILOL PHOSPHATE 25 MILLIGRAM(S): 80 CAPSULE, EXTENDED RELEASE ORAL at 05:14

## 2020-04-02 RX ADMIN — ENOXAPARIN SODIUM 40 MILLIGRAM(S): 100 INJECTION SUBCUTANEOUS at 13:12

## 2020-04-02 RX ADMIN — Medication 2: at 18:24

## 2020-04-02 RX ADMIN — AMLODIPINE BESYLATE 10 MILLIGRAM(S): 2.5 TABLET ORAL at 05:14

## 2020-04-02 RX ADMIN — Medication 600 MILLIGRAM(S): at 05:14

## 2020-04-02 RX ADMIN — Medication 600 MILLIGRAM(S): at 18:25

## 2020-04-02 RX ADMIN — Medication 200 MILLIGRAM(S): at 18:25

## 2020-04-02 RX ADMIN — Medication 100 MILLIGRAM(S): at 13:12

## 2020-04-02 RX ADMIN — ATORVASTATIN CALCIUM 20 MILLIGRAM(S): 80 TABLET, FILM COATED ORAL at 20:39

## 2020-04-02 RX ADMIN — Medication 200 MILLIGRAM(S): at 05:14

## 2020-04-02 RX ADMIN — PANTOPRAZOLE SODIUM 40 MILLIGRAM(S): 20 TABLET, DELAYED RELEASE ORAL at 05:13

## 2020-04-02 RX ADMIN — ZINC SULFATE TAB 220 MG (50 MG ZINC EQUIVALENT) 220 MILLIGRAM(S): 220 (50 ZN) TAB at 13:12

## 2020-04-02 RX ADMIN — Medication 100 MILLIGRAM(S): at 05:14

## 2020-04-02 RX ADMIN — SERTRALINE 50 MILLIGRAM(S): 25 TABLET, FILM COATED ORAL at 13:12

## 2020-04-02 RX ADMIN — Medication 500 MILLIGRAM(S): at 13:12

## 2020-04-02 RX ADMIN — CARVEDILOL PHOSPHATE 25 MILLIGRAM(S): 80 CAPSULE, EXTENDED RELEASE ORAL at 18:24

## 2020-04-02 RX ADMIN — ONDANSETRON 4 MILLIGRAM(S): 8 TABLET, FILM COATED ORAL at 15:13

## 2020-04-02 NOTE — PROGRESS NOTE ADULT - PROBLEM SELECTOR PLAN 1
Patient with hypoxic respiratory failure, 2/2 to COVID.  still requiring a significant amt of O2.   GOC Conversation yesterday  son. Does not want DNR  Asked ID about another trial and change of medications  c/w other symptomatic management  -Zinc, thiamine, Vitamin C.   -Trend CRP, ESR, ferritin, LDH.

## 2020-04-02 NOTE — PROGRESS NOTE ADULT - SUBJECTIVE AND OBJECTIVE BOX
[facility] Division of Hospital Medicine Progress Note    Patient is a 74y old  Female who presents with a chief complaint of cough and SOB (31 Mar 2020 15:29)      SUBJECTIVE / OVERNIGHT EVENTS:  ADDITIONAL REVIEW OF SYSTEMS:    MEDICATIONS  (STANDING):  amLODIPine   Tablet 10 milliGRAM(s) Oral daily  ascorbic acid 500 milliGRAM(s) Oral daily  atorvastatin 20 milliGRAM(s) Oral at bedtime  benzonatate 100 milliGRAM(s) Oral every 8 hours  carvedilol 25 milliGRAM(s) Oral every 12 hours  dextrose 5%. 1000 milliLiter(s) (50 mL/Hr) IV Continuous <Continuous>  dextrose 50% Injectable 12.5 Gram(s) IV Push once  dextrose 50% Injectable 25 Gram(s) IV Push once  dextrose 50% Injectable 25 Gram(s) IV Push once  enoxaparin Injectable 40 milliGRAM(s) SubCutaneous daily  guaiFENesin  milliGRAM(s) Oral every 12 hours  hydroxychloroquine 200 milliGRAM(s) Oral every 12 hours  hydroxychloroquine   Oral   insulin lispro (HumaLOG) corrective regimen sliding scale   SubCutaneous three times a day before meals  insulin lispro (HumaLOG) corrective regimen sliding scale   SubCutaneous at bedtime  pantoprazole    Tablet 40 milliGRAM(s) Oral before breakfast  sertraline 50 milliGRAM(s) Oral daily  thiamine 100 milliGRAM(s) Oral daily  zinc sulfate 220 milliGRAM(s) Oral daily    MEDICATIONS  (PRN):  acetaminophen   Tablet .. 650 milliGRAM(s) Oral every 6 hours PRN Temp greater or equal to 38C (100.4F), Mild Pain (1 - 3)  dextrose 40% Gel 15 Gram(s) Oral once PRN Blood Glucose LESS THAN 70 milliGRAM(s)/deciliter  glucagon  Injectable 1 milliGRAM(s) IntraMuscular once PRN Glucose LESS THAN 70 milligrams/deciliter      CAPILLARY BLOOD GLUCOSE      POCT Blood Glucose.: 114 mg/dL (01 Apr 2020 09:21)  POCT Blood Glucose.: 176 mg/dL (31 Mar 2020 22:00)  POCT Blood Glucose.: 144 mg/dL (31 Mar 2020 17:38)    I&O's Summary    31 Mar 2020 07:01  -  01 Apr 2020 07:00  --------------------------------------------------------  IN: 0 mL / OUT: 2000 mL / NET: -2000 mL        PHYSICAL EXAM:  Vital Signs Last 24 Hrs  T(C): 37.4 (01 Apr 2020 06:11), Max: 37.4 (01 Apr 2020 06:11)  T(F): 99.4 (01 Apr 2020 06:11), Max: 99.4 (01 Apr 2020 06:11)  HR: 84 (01 Apr 2020 06:11) (75 - 84)  BP: 150/76 (01 Apr 2020 06:11) (125/76 - 150/76)  BP(mean): --  RR: 20 (01 Apr 2020 06:11) (20 - 20)  SpO2: 91% (01 Apr 2020 06:11) (91% - 95%)  CONSTITUTIONAL: NAD, well-developed, well-groomed  ENMT: Moist oral mucosa, no pharyngeal injection or exudates; normal dentition  RESPIRATORY: Normal respiratory effort; lungs are clear to auscultation bilaterally  CARDIOVASCULAR: Regular rate and rhythm, normal S1 and S2, no murmur/rub/gallop; No lower extremity edema; Peripheral pulses are 2+ bilaterally  ABDOMEN: Nontender to palpation, normoactive bowel sounds, no rebound/guarding; No hepatosplenomegaly  PSYCH: A+O to person, place, and time; affect appropriate  NEUROLOGY: CN 2-12 are intact and symmetric; no gross sensory deficits   SKIN: No rashes; no palpable lesions    LABS:                        11.2   6.32  )-----------( 433      ( 01 Apr 2020 10:03 )             35.7     04-01    138  |  100  |  14  ----------------------------<  113<H>  4.2   |  26  |  0.69    Ca    9.5      01 Apr 2020 10:03    TPro  7.1  /  Alb  3.2<L>  /  TBili  0.5  /  DBili  0.2  /  AST  50<H>  /  ALT  48<H>  /  AlkPhos  115  04-01              COVID-19 PCR: Detected (03-28-20 @ 03:25)  COVID-19 PCR: NotDetec (03-27-20 @ 23:51)      RADIOLOGY & ADDITIONAL TESTS:  Imaging from Last 24 Hours:  Electrocardiogram/QTc Interval:    COORDINATION OF CARE:  Care Discussed with Consultants/Other Providers:

## 2020-04-02 NOTE — CHART NOTE - NSCHARTNOTEFT_GEN_A_CORE
Biotel Monitoring Note:    COVID Patient on hydroxychloroquine    Biotel Reading on: 4/1  QTc measurement: 439ms

## 2020-04-03 LAB
ALBUMIN SERPL ELPH-MCNC: 2.9 G/DL — LOW (ref 3.3–5)
ALP SERPL-CCNC: 116 U/L — SIGNIFICANT CHANGE UP (ref 40–120)
ALT FLD-CCNC: 45 U/L — SIGNIFICANT CHANGE UP (ref 10–45)
ANION GAP SERPL CALC-SCNC: 12 MMOL/L — SIGNIFICANT CHANGE UP (ref 5–17)
AST SERPL-CCNC: 39 U/L — SIGNIFICANT CHANGE UP (ref 10–40)
BILIRUB SERPL-MCNC: 0.4 MG/DL — SIGNIFICANT CHANGE UP (ref 0.2–1.2)
BUN SERPL-MCNC: 22 MG/DL — SIGNIFICANT CHANGE UP (ref 7–23)
CALCIUM SERPL-MCNC: 9.5 MG/DL — SIGNIFICANT CHANGE UP (ref 8.4–10.5)
CHLORIDE SERPL-SCNC: 99 MMOL/L — SIGNIFICANT CHANGE UP (ref 96–108)
CO2 SERPL-SCNC: 29 MMOL/L — SIGNIFICANT CHANGE UP (ref 22–31)
CREAT SERPL-MCNC: 0.85 MG/DL — SIGNIFICANT CHANGE UP (ref 0.5–1.3)
CRP SERPL-MCNC: 7.73 MG/DL — HIGH (ref 0–0.4)
FERRITIN SERPL-MCNC: 310 NG/ML — HIGH (ref 15–150)
GLUCOSE BLDC GLUCOMTR-MCNC: 102 MG/DL — HIGH (ref 70–99)
GLUCOSE BLDC GLUCOMTR-MCNC: 131 MG/DL — HIGH (ref 70–99)
GLUCOSE BLDC GLUCOMTR-MCNC: 143 MG/DL — HIGH (ref 70–99)
GLUCOSE BLDC GLUCOMTR-MCNC: 153 MG/DL — HIGH (ref 70–99)
GLUCOSE SERPL-MCNC: 107 MG/DL — HIGH (ref 70–99)
HCT VFR BLD CALC: 34.6 % — SIGNIFICANT CHANGE UP (ref 34.5–45)
HGB BLD-MCNC: 10.7 G/DL — LOW (ref 11.5–15.5)
LDH SERPL L TO P-CCNC: 391 U/L — HIGH (ref 50–242)
MCHC RBC-ENTMCNC: 25.2 PG — LOW (ref 27–34)
MCHC RBC-ENTMCNC: 30.9 GM/DL — LOW (ref 32–36)
MCV RBC AUTO: 81.6 FL — SIGNIFICANT CHANGE UP (ref 80–100)
NRBC # BLD: 0 /100 WBCS — SIGNIFICANT CHANGE UP (ref 0–0)
PLATELET # BLD AUTO: 512 K/UL — HIGH (ref 150–400)
POTASSIUM SERPL-MCNC: 4.1 MMOL/L — SIGNIFICANT CHANGE UP (ref 3.5–5.3)
POTASSIUM SERPL-SCNC: 4.1 MMOL/L — SIGNIFICANT CHANGE UP (ref 3.5–5.3)
PROCALCITONIN SERPL-MCNC: 0.18 NG/ML — HIGH (ref 0.02–0.1)
PROT SERPL-MCNC: 7.2 G/DL — SIGNIFICANT CHANGE UP (ref 6–8.3)
RBC # BLD: 4.24 M/UL — SIGNIFICANT CHANGE UP (ref 3.8–5.2)
RBC # FLD: 14.4 % — SIGNIFICANT CHANGE UP (ref 10.3–14.5)
SODIUM SERPL-SCNC: 140 MMOL/L — SIGNIFICANT CHANGE UP (ref 135–145)
WBC # BLD: 7.36 K/UL — SIGNIFICANT CHANGE UP (ref 3.8–10.5)
WBC # FLD AUTO: 7.36 K/UL — SIGNIFICANT CHANGE UP (ref 3.8–10.5)

## 2020-04-03 PROCEDURE — 99231 SBSQ HOSP IP/OBS SF/LOW 25: CPT

## 2020-04-03 RX ORDER — DIPHENHYDRAMINE HCL 50 MG
50 CAPSULE ORAL ONCE
Refills: 0 | Status: DISCONTINUED | OUTPATIENT
Start: 2020-04-03 | End: 2020-04-08

## 2020-04-03 RX ORDER — EPINEPHRINE 0.3 MG/.3ML
0.3 INJECTION INTRAMUSCULAR; SUBCUTANEOUS ONCE
Refills: 0 | Status: DISCONTINUED | OUTPATIENT
Start: 2020-04-03 | End: 2020-04-08

## 2020-04-03 RX ADMIN — Medication 500 MILLIGRAM(S): at 13:42

## 2020-04-03 RX ADMIN — ZINC SULFATE TAB 220 MG (50 MG ZINC EQUIVALENT) 220 MILLIGRAM(S): 220 (50 ZN) TAB at 13:42

## 2020-04-03 RX ADMIN — SERTRALINE 50 MILLIGRAM(S): 25 TABLET, FILM COATED ORAL at 14:45

## 2020-04-03 RX ADMIN — Medication 600 MILLIGRAM(S): at 05:11

## 2020-04-03 RX ADMIN — Medication 2: at 14:45

## 2020-04-03 RX ADMIN — Medication 100 MILLIGRAM(S): at 13:42

## 2020-04-03 RX ADMIN — CARVEDILOL PHOSPHATE 25 MILLIGRAM(S): 80 CAPSULE, EXTENDED RELEASE ORAL at 05:11

## 2020-04-03 RX ADMIN — ENOXAPARIN SODIUM 40 MILLIGRAM(S): 100 INJECTION SUBCUTANEOUS at 13:42

## 2020-04-03 RX ADMIN — Medication 600 MILLIGRAM(S): at 18:28

## 2020-04-03 RX ADMIN — Medication 100 MILLIGRAM(S): at 20:36

## 2020-04-03 RX ADMIN — CARVEDILOL PHOSPHATE 25 MILLIGRAM(S): 80 CAPSULE, EXTENDED RELEASE ORAL at 18:28

## 2020-04-03 RX ADMIN — AMLODIPINE BESYLATE 10 MILLIGRAM(S): 2.5 TABLET ORAL at 05:11

## 2020-04-03 RX ADMIN — ATORVASTATIN CALCIUM 20 MILLIGRAM(S): 80 TABLET, FILM COATED ORAL at 20:36

## 2020-04-03 RX ADMIN — PANTOPRAZOLE SODIUM 40 MILLIGRAM(S): 20 TABLET, DELAYED RELEASE ORAL at 05:11

## 2020-04-03 RX ADMIN — Medication 100 MILLIGRAM(S): at 05:11

## 2020-04-03 NOTE — CHART NOTE - NSCHARTNOTEFT_GEN_A_CORE
Nutrition Follow-up Note    Patient seen for: Malnutrition follow-up.    Source of Information: Attempted to call pt x 2 per COVID-19 isolation policy; no answer    Current Diet: Soft, Pescovegetarian, Consistent CHO no snacks, DASH/TLC    Subjective Information: unable to obtain at this time    Objective Information:  Vitamin C, zinc, and thiamine supplement ordered  CAPILLARY BLOOD GLUCOSE  POCT Blood Glucose.: 102 mg/dL (03 Apr 2020 10:22)  POCT Blood Glucose.: 146 mg/dL (02 Apr 2020 21:42)  POCT Blood Glucose.: 164 mg/dL (02 Apr 2020 18:20)  POCT Blood Glucose.: 132 mg/dL (02 Apr 2020 13:02)      Previous Nutrition Diagnosis: Mild Malnutrition       New Nutrition Diagnosis: n/a      Nutrition Care Plan:  [ ] In progress   [x] Achieved    Nutrition Interventions:    -Recommend continue soft diet per pt preference. Continue current consistent CHO, DASH diet to promote glucose control and heart health.   -Continue LYN Mightyshake 4oz. with meals for additional 600 kcal, 21 grams protein to supplement meals.  -Continue vitamin C, thiamine, and zinc supplements    Monitoring and Evaluation:   Continue to monitor po intake, tolerance to diet prescription, weights, labs, skin integrity    RD remains available upon request: Zita Kiran MS, RDN, CDN, CDE, CSOW. #[phone]

## 2020-04-03 NOTE — PROGRESS NOTE ADULT - SUBJECTIVE AND OBJECTIVE BOX
[facility] Division of Hospital Medicine Progress Note    Patient is a 74y old  Female who presents with a chief complaint of cough and SOB (02 Apr 2020 12:51)      SUBJECTIVE / OVERNIGHT EVENTS:  ADDITIONAL REVIEW OF SYSTEMS:    MEDICATIONS  (STANDING):  amLODIPine   Tablet 10 milliGRAM(s) Oral daily  ascorbic acid 500 milliGRAM(s) Oral daily  atorvastatin 20 milliGRAM(s) Oral at bedtime  benzonatate 100 milliGRAM(s) Oral every 8 hours  carvedilol 25 milliGRAM(s) Oral every 12 hours  dextrose 5%. 1000 milliLiter(s) (50 mL/Hr) IV Continuous <Continuous>  dextrose 50% Injectable 12.5 Gram(s) IV Push once  dextrose 50% Injectable 25 Gram(s) IV Push once  dextrose 50% Injectable 25 Gram(s) IV Push once  enoxaparin Injectable 40 milliGRAM(s) SubCutaneous daily  EPINEPHrine    Injectable 0.3 milliGRAM(s) IntraMuscular once  guaiFENesin  milliGRAM(s) Oral every 12 hours  insulin lispro (HumaLOG) corrective regimen sliding scale   SubCutaneous three times a day before meals  insulin lispro (HumaLOG) corrective regimen sliding scale   SubCutaneous at bedtime  pantoprazole    Tablet 40 milliGRAM(s) Oral before breakfast  sertraline 50 milliGRAM(s) Oral daily  thiamine 100 milliGRAM(s) Oral daily  zinc sulfate 220 milliGRAM(s) Oral daily    MEDICATIONS  (PRN):  acetaminophen   Tablet .. 650 milliGRAM(s) Oral every 6 hours PRN Temp greater or equal to 38C (100.4F), Mild Pain (1 - 3)  ALBUTerol    90 MICROgram(s) HFA Inhaler 2 Puff(s) Inhalation every 6 hours PRN Shortness of Breath and/or Wheezing  dextrose 40% Gel 15 Gram(s) Oral once PRN Blood Glucose LESS THAN 70 milliGRAM(s)/deciliter  diphenhydrAMINE   Injectable 50 milliGRAM(s) IV Push once PRN Reaction to Study Medication  glucagon  Injectable 1 milliGRAM(s) IntraMuscular once PRN Glucose LESS THAN 70 milligrams/deciliter      CAPILLARY BLOOD GLUCOSE      POCT Blood Glucose.: 102 mg/dL (03 Apr 2020 10:22)  POCT Blood Glucose.: 146 mg/dL (02 Apr 2020 21:42)  POCT Blood Glucose.: 164 mg/dL (02 Apr 2020 18:20)    I&O's Summary    02 Apr 2020 07:01  -  03 Apr 2020 07:00  --------------------------------------------------------  IN: 480 mL / OUT: 0 mL / NET: 480 mL        PHYSICAL EXAM:  Vital Signs Last 24 Hrs  T(C): 36.9 (03 Apr 2020 13:45), Max: 37.1 (03 Apr 2020 03:50)  T(F): 98.5 (03 Apr 2020 13:45), Max: 98.7 (03 Apr 2020 03:50)  HR: 73 (03 Apr 2020 13:45) (71 - 90)  BP: 125/77 (03 Apr 2020 13:45) (115/74 - 137/83)  BP(mean): --  RR: 20 (03 Apr 2020 13:45) (20 - 20)  SpO2: 92% (03 Apr 2020 13:45) (92% - 94%)  CONSTITUTIONAL: NAD, well-developed, well-groomed  ENMT: Moist oral mucosa, no pharyngeal injection or exudates; normal dentition  RESPIRATORY: Normal respiratory effort; lungs are clear to auscultation bilaterally  CARDIOVASCULAR: Regular rate and rhythm, normal S1 and S2, no murmur/rub/gallop; No lower extremity edema; Peripheral pulses are 2+ bilaterally  ABDOMEN: Nontender to palpation, normoactive bowel sounds, no rebound/guarding; No hepatosplenomegaly  PSYCH: A+O to person, place, and time; affect appropriate  NEUROLOGY: CN 2-12 are intact and symmetric; no gross sensory deficits   SKIN: No rashes; no palpable lesions    LABS:                        10.7   7.36  )-----------( 512      ( 03 Apr 2020 07:18 )             34.6     04-03    140  |  99  |  22  ----------------------------<  107<H>  4.1   |  29  |  0.85    Ca    9.5      03 Apr 2020 07:17    TPro  7.2  /  Alb  2.9<L>  /  TBili  0.4  /  DBili  x   /  AST  39  /  ALT  45  /  AlkPhos  116  04-03              COVID-19 PCR: Detected (03-28-20 @ 03:25)  COVID-19 PCR: NotDetec (03-27-20 @ 23:51)      RADIOLOGY & ADDITIONAL TESTS:  Imaging from Last 24 Hours:  Electrocardiogram/QTc Interval:    COORDINATION OF CARE:  Care Discussed with Consultants/Other Providers:

## 2020-04-03 NOTE — CHART NOTE - NSCHARTNOTEFT_GEN_A_CORE
Patient consented to investigational trial  [facility] sponsored trial of Sarilumab IL6 blocer vs placebo  discussed by phone at patient's bedside with daughter

## 2020-04-03 NOTE — CHART NOTE - NSCHARTNOTEFT_GEN_A_CORE
Biotel Monitoring Note:    COVID Patient on hydroxychloroquine    Biotel Reading on: 4/2/20  QTc measurement: 439ms

## 2020-04-04 LAB
ALBUMIN SERPL ELPH-MCNC: 3.3 G/DL — SIGNIFICANT CHANGE UP (ref 3.3–5)
ALP SERPL-CCNC: 117 U/L — SIGNIFICANT CHANGE UP (ref 40–120)
ALT FLD-CCNC: 45 U/L — SIGNIFICANT CHANGE UP (ref 10–45)
ANION GAP SERPL CALC-SCNC: 15 MMOL/L — SIGNIFICANT CHANGE UP (ref 5–17)
AST SERPL-CCNC: 44 U/L — HIGH (ref 10–40)
BILIRUB SERPL-MCNC: 0.3 MG/DL — SIGNIFICANT CHANGE UP (ref 0.2–1.2)
BUN SERPL-MCNC: 23 MG/DL — SIGNIFICANT CHANGE UP (ref 7–23)
CALCIUM SERPL-MCNC: 9.5 MG/DL — SIGNIFICANT CHANGE UP (ref 8.4–10.5)
CHLORIDE SERPL-SCNC: 100 MMOL/L — SIGNIFICANT CHANGE UP (ref 96–108)
CO2 SERPL-SCNC: 26 MMOL/L — SIGNIFICANT CHANGE UP (ref 22–31)
CREAT SERPL-MCNC: 0.81 MG/DL — SIGNIFICANT CHANGE UP (ref 0.5–1.3)
CRP SERPL-MCNC: 3.99 MG/DL — HIGH (ref 0–0.4)
FERRITIN SERPL-MCNC: 272 NG/ML — HIGH (ref 15–150)
GLUCOSE BLDC GLUCOMTR-MCNC: 126 MG/DL — HIGH (ref 70–99)
GLUCOSE BLDC GLUCOMTR-MCNC: 128 MG/DL — HIGH (ref 70–99)
GLUCOSE BLDC GLUCOMTR-MCNC: 128 MG/DL — HIGH (ref 70–99)
GLUCOSE BLDC GLUCOMTR-MCNC: 134 MG/DL — HIGH (ref 70–99)
GLUCOSE SERPL-MCNC: 117 MG/DL — HIGH (ref 70–99)
LDH SERPL L TO P-CCNC: 367 U/L — HIGH (ref 50–242)
POTASSIUM SERPL-MCNC: 4.2 MMOL/L — SIGNIFICANT CHANGE UP (ref 3.5–5.3)
POTASSIUM SERPL-SCNC: 4.2 MMOL/L — SIGNIFICANT CHANGE UP (ref 3.5–5.3)
PROT SERPL-MCNC: 7.1 G/DL — SIGNIFICANT CHANGE UP (ref 6–8.3)
SODIUM SERPL-SCNC: 141 MMOL/L — SIGNIFICANT CHANGE UP (ref 135–145)

## 2020-04-04 PROCEDURE — 99233 SBSQ HOSP IP/OBS HIGH 50: CPT

## 2020-04-04 RX ADMIN — CARVEDILOL PHOSPHATE 25 MILLIGRAM(S): 80 CAPSULE, EXTENDED RELEASE ORAL at 17:43

## 2020-04-04 RX ADMIN — Medication 600 MILLIGRAM(S): at 17:43

## 2020-04-04 RX ADMIN — ENOXAPARIN SODIUM 40 MILLIGRAM(S): 100 INJECTION SUBCUTANEOUS at 13:18

## 2020-04-04 RX ADMIN — CARVEDILOL PHOSPHATE 25 MILLIGRAM(S): 80 CAPSULE, EXTENDED RELEASE ORAL at 05:43

## 2020-04-04 RX ADMIN — PANTOPRAZOLE SODIUM 40 MILLIGRAM(S): 20 TABLET, DELAYED RELEASE ORAL at 05:43

## 2020-04-04 RX ADMIN — Medication 100 MILLIGRAM(S): at 05:43

## 2020-04-04 RX ADMIN — Medication 600 MILLIGRAM(S): at 05:43

## 2020-04-04 RX ADMIN — AMLODIPINE BESYLATE 10 MILLIGRAM(S): 2.5 TABLET ORAL at 05:44

## 2020-04-04 RX ADMIN — ZINC SULFATE TAB 220 MG (50 MG ZINC EQUIVALENT) 220 MILLIGRAM(S): 220 (50 ZN) TAB at 13:19

## 2020-04-04 RX ADMIN — Medication 100 MILLIGRAM(S): at 13:19

## 2020-04-04 RX ADMIN — Medication 100 MILLIGRAM(S): at 22:56

## 2020-04-04 RX ADMIN — Medication 500 MILLIGRAM(S): at 13:18

## 2020-04-04 RX ADMIN — Medication 100 MILLIGRAM(S): at 13:18

## 2020-04-04 RX ADMIN — ATORVASTATIN CALCIUM 20 MILLIGRAM(S): 80 TABLET, FILM COATED ORAL at 22:56

## 2020-04-04 RX ADMIN — SERTRALINE 50 MILLIGRAM(S): 25 TABLET, FILM COATED ORAL at 13:19

## 2020-04-04 NOTE — PROGRESS NOTE ADULT - PROBLEM SELECTOR PLAN 1
Patient with hypoxic respiratory failure, 2/2 to COVID.  still requiring a significant amt of O2 - now on 5L  completed plaquenil  consented to trial with ID yesterday  c/w other symptomatic management  -Zinc, thiamine, Vitamin C.   -Trend CRP, ESR, ferritin, LDH. Patient with hypoxic respiratory failure, 2/2 to COVID.  still requiring a significant amt of O2 - now on 5L  completed plaquenil  consented to trial with ID yesterday  c/w other symptomatic management  -Zinc, thiamine, Vitamin C.   -Trend CRP, ESR, ferritin, LDH.  -updated daughter Suzie on patient's status today

## 2020-04-04 NOTE — PROGRESS NOTE ADULT - SUBJECTIVE AND OBJECTIVE BOX
[facility] Division of Hospital Medicine Progress Note    Patient is a 74y old  Female who presents with a chief complaint of cough and SOB (03 Apr 2020 13:48)      SUBJECTIVE / OVERNIGHT EVENTS:  Feeling okay today, currently on 5L saturating 98%.  ADDITIONAL REVIEW OF SYSTEMS:  Denies abdominal pain    MEDICATIONS  (STANDING):  amLODIPine   Tablet 10 milliGRAM(s) Oral daily  ascorbic acid 500 milliGRAM(s) Oral daily  atorvastatin 20 milliGRAM(s) Oral at bedtime  benzonatate 100 milliGRAM(s) Oral every 8 hours  carvedilol 25 milliGRAM(s) Oral every 12 hours  dextrose 5%. 1000 milliLiter(s) (50 mL/Hr) IV Continuous <Continuous>  dextrose 50% Injectable 12.5 Gram(s) IV Push once  dextrose 50% Injectable 25 Gram(s) IV Push once  dextrose 50% Injectable 25 Gram(s) IV Push once  enoxaparin Injectable 40 milliGRAM(s) SubCutaneous daily  EPINEPHrine    Injectable 0.3 milliGRAM(s) IntraMuscular once  guaiFENesin  milliGRAM(s) Oral every 12 hours  insulin lispro (HumaLOG) corrective regimen sliding scale   SubCutaneous three times a day before meals  insulin lispro (HumaLOG) corrective regimen sliding scale   SubCutaneous at bedtime  pantoprazole    Tablet 40 milliGRAM(s) Oral before breakfast  sertraline 50 milliGRAM(s) Oral daily  thiamine 100 milliGRAM(s) Oral daily  zinc sulfate 220 milliGRAM(s) Oral daily    MEDICATIONS  (PRN):  acetaminophen   Tablet .. 650 milliGRAM(s) Oral every 6 hours PRN Temp greater or equal to 38C (100.4F), Mild Pain (1 - 3)  ALBUTerol    90 MICROgram(s) HFA Inhaler 2 Puff(s) Inhalation every 6 hours PRN Shortness of Breath and/or Wheezing  dextrose 40% Gel 15 Gram(s) Oral once PRN Blood Glucose LESS THAN 70 milliGRAM(s)/deciliter  diphenhydrAMINE   Injectable 50 milliGRAM(s) IV Push once PRN Reaction to Study Medication  glucagon  Injectable 1 milliGRAM(s) IntraMuscular once PRN Glucose LESS THAN 70 milligrams/deciliter      CAPILLARY BLOOD GLUCOSE      POCT Blood Glucose.: 126 mg/dL (04 Apr 2020 12:17)  POCT Blood Glucose.: 128 mg/dL (04 Apr 2020 09:06)  POCT Blood Glucose.: 131 mg/dL (03 Apr 2020 20:41)  POCT Blood Glucose.: 143 mg/dL (03 Apr 2020 16:50)  POCT Blood Glucose.: 153 mg/dL (03 Apr 2020 14:21)    I&O's Summary    03 Apr 2020 07:01  -  04 Apr 2020 07:00  --------------------------------------------------------  IN: 0 mL / OUT: 800 mL / NET: -800 mL    04 Apr 2020 07:01  -  04 Apr 2020 13:22  --------------------------------------------------------  IN: 240 mL / OUT: 0 mL / NET: 240 mL        PHYSICAL EXAM:  Vital Signs Last 24 Hrs  T(C): 36.9 (04 Apr 2020 05:33), Max: 37.1 (03 Apr 2020 14:50)  T(F): 98.4 (04 Apr 2020 05:33), Max: 98.7 (03 Apr 2020 14:50)  HR: 77 (04 Apr 2020 05:33) (73 - 92)  BP: 148/84 (04 Apr 2020 05:33) (124/75 - 148/84)  BP(mean): --  RR: 20 (04 Apr 2020 05:33) (20 - 20)  SpO2: 98% (04 Apr 2020 05:33) (92% - 98%)    CONSTITUTIONAL: NAD, well-developed, well-groomed  ENMT: Moist oral mucosa, no pharyngeal injection or exudates; normal dentition  RESPIRATORY: Normal respiratory effort; mildly decreased BS BL  CARDIOVASCULAR: Regular rate and rhythm, normal S1 and S2, no murmur/rub/gallop; No lower extremity edema; Peripheral pulses are 2+ bilaterally  ABDOMEN: Nontender to palpation, normoactive bowel sounds, no rebound/guarding; No hepatosplenomegaly  PSYCH: affect appropriate  NEUROLOGY: CN 2-12 are intact and symmetric; no gross sensory deficits   SKIN: No rashes; no palpable lesions    LABS:                        10.7   7.36  )-----------( 512      ( 03 Apr 2020 07:18 )             34.6     04-04    141  |  100  |  23  ----------------------------<  117<H>  4.2   |  26  |  0.81    Ca    9.5      04 Apr 2020 07:48    TPro  7.1  /  Alb  3.3  /  TBili  0.3  /  DBili  x   /  AST  44<H>  /  ALT  45  /  AlkPhos  117  04-04              COVID-19 PCR: Detected (03-28-20 @ 03:25)  COVID-19 PCR: NotDetec (03-27-20 @ 23:51)      RADIOLOGY & ADDITIONAL TESTS:  Imaging from Last 24 Hours:  Electrocardiogram/QTc Interval:    COORDINATION OF CARE:  Care Discussed with Consultants/Other Providers:

## 2020-04-05 LAB
ALBUMIN SERPL ELPH-MCNC: 3.1 G/DL — LOW (ref 3.3–5)
ALP SERPL-CCNC: 110 U/L — SIGNIFICANT CHANGE UP (ref 40–120)
ALT FLD-CCNC: 46 U/L — HIGH (ref 10–45)
ANION GAP SERPL CALC-SCNC: 10 MMOL/L — SIGNIFICANT CHANGE UP (ref 5–17)
AST SERPL-CCNC: 40 U/L — SIGNIFICANT CHANGE UP (ref 10–40)
BILIRUB SERPL-MCNC: 0.3 MG/DL — SIGNIFICANT CHANGE UP (ref 0.2–1.2)
BUN SERPL-MCNC: 16 MG/DL — SIGNIFICANT CHANGE UP (ref 7–23)
CALCIUM SERPL-MCNC: 9.4 MG/DL — SIGNIFICANT CHANGE UP (ref 8.4–10.5)
CHLORIDE SERPL-SCNC: 103 MMOL/L — SIGNIFICANT CHANGE UP (ref 96–108)
CO2 SERPL-SCNC: 29 MMOL/L — SIGNIFICANT CHANGE UP (ref 22–31)
CREAT SERPL-MCNC: 0.67 MG/DL — SIGNIFICANT CHANGE UP (ref 0.5–1.3)
CRP SERPL-MCNC: 2.11 MG/DL — HIGH (ref 0–0.4)
FERRITIN SERPL-MCNC: 249 NG/ML — HIGH (ref 15–150)
GLUCOSE BLDC GLUCOMTR-MCNC: 134 MG/DL — HIGH (ref 70–99)
GLUCOSE BLDC GLUCOMTR-MCNC: 141 MG/DL — HIGH (ref 70–99)
GLUCOSE BLDC GLUCOMTR-MCNC: 156 MG/DL — HIGH (ref 70–99)
GLUCOSE BLDC GLUCOMTR-MCNC: 160 MG/DL — HIGH (ref 70–99)
GLUCOSE SERPL-MCNC: 128 MG/DL — HIGH (ref 70–99)
HCT VFR BLD CALC: 36.3 % — SIGNIFICANT CHANGE UP (ref 34.5–45)
HGB BLD-MCNC: 10.9 G/DL — LOW (ref 11.5–15.5)
LDH SERPL L TO P-CCNC: 362 U/L — HIGH (ref 50–242)
MCHC RBC-ENTMCNC: 24.9 PG — LOW (ref 27–34)
MCHC RBC-ENTMCNC: 30 GM/DL — LOW (ref 32–36)
MCV RBC AUTO: 82.9 FL — SIGNIFICANT CHANGE UP (ref 80–100)
NRBC # BLD: 0 /100 WBCS — SIGNIFICANT CHANGE UP (ref 0–0)
PLATELET # BLD AUTO: 528 K/UL — HIGH (ref 150–400)
POTASSIUM SERPL-MCNC: 4 MMOL/L — SIGNIFICANT CHANGE UP (ref 3.5–5.3)
POTASSIUM SERPL-SCNC: 4 MMOL/L — SIGNIFICANT CHANGE UP (ref 3.5–5.3)
PROT SERPL-MCNC: 6.8 G/DL — SIGNIFICANT CHANGE UP (ref 6–8.3)
RBC # BLD: 4.38 M/UL — SIGNIFICANT CHANGE UP (ref 3.8–5.2)
RBC # FLD: 14.3 % — SIGNIFICANT CHANGE UP (ref 10.3–14.5)
SODIUM SERPL-SCNC: 142 MMOL/L — SIGNIFICANT CHANGE UP (ref 135–145)
WBC # BLD: 5.15 K/UL — SIGNIFICANT CHANGE UP (ref 3.8–10.5)
WBC # FLD AUTO: 5.15 K/UL — SIGNIFICANT CHANGE UP (ref 3.8–10.5)

## 2020-04-05 PROCEDURE — 99233 SBSQ HOSP IP/OBS HIGH 50: CPT

## 2020-04-05 RX ADMIN — Medication 100 MILLIGRAM(S): at 13:42

## 2020-04-05 RX ADMIN — AMLODIPINE BESYLATE 10 MILLIGRAM(S): 2.5 TABLET ORAL at 04:48

## 2020-04-05 RX ADMIN — Medication 600 MILLIGRAM(S): at 18:16

## 2020-04-05 RX ADMIN — PANTOPRAZOLE SODIUM 40 MILLIGRAM(S): 20 TABLET, DELAYED RELEASE ORAL at 04:48

## 2020-04-05 RX ADMIN — ENOXAPARIN SODIUM 40 MILLIGRAM(S): 100 INJECTION SUBCUTANEOUS at 13:43

## 2020-04-05 RX ADMIN — CARVEDILOL PHOSPHATE 25 MILLIGRAM(S): 80 CAPSULE, EXTENDED RELEASE ORAL at 18:16

## 2020-04-05 RX ADMIN — Medication 500 MILLIGRAM(S): at 13:42

## 2020-04-05 RX ADMIN — ATORVASTATIN CALCIUM 20 MILLIGRAM(S): 80 TABLET, FILM COATED ORAL at 21:55

## 2020-04-05 RX ADMIN — Medication 100 MILLIGRAM(S): at 21:55

## 2020-04-05 RX ADMIN — SERTRALINE 50 MILLIGRAM(S): 25 TABLET, FILM COATED ORAL at 13:42

## 2020-04-05 RX ADMIN — Medication 100 MILLIGRAM(S): at 04:48

## 2020-04-05 RX ADMIN — CARVEDILOL PHOSPHATE 25 MILLIGRAM(S): 80 CAPSULE, EXTENDED RELEASE ORAL at 04:48

## 2020-04-05 RX ADMIN — Medication 2: at 13:53

## 2020-04-05 RX ADMIN — Medication 600 MILLIGRAM(S): at 04:48

## 2020-04-05 NOTE — PROGRESS NOTE ADULT - PROBLEM SELECTOR PLAN 5
c/w atorvastatin

## 2020-04-05 NOTE — PROGRESS NOTE ADULT - PROBLEM SELECTOR PROBLEM 1
Everything that we have has been submitted via [facility] web portal, did note that we are waiting for PT and possible updated imaging               Dominique TRINH    [facility]    
Respiratory failure with hypoxia

## 2020-04-05 NOTE — PROGRESS NOTE ADULT - PROBLEM SELECTOR PLAN 2
- Initial COVID test was negative, repeat test positive.   - Continue with supplemental oxygen with goal SpO2>92 wean as tolerated, if requires escalate to NRB and avoid BiLevel/High Flow Nasal Cannula per institution policy given risk of aerosolization  - Since COVID positive, c/w Hydroxychloroquine 400mg x2 doses and then 200mg BID for 4 days (completed) The Benefits/Risks/Alternatives were reportedly discussed with next of kin who reportedly were in agreement with therapy although not currently FDA-approved for the treatment of COVID19  - Trend ESR, CRP, Procalcitonin  - monitor EKG for QTc prolongation  - guaifenesin/tessalon perles/ acetaminophen prn. Supportive care.   - isolation precautions  - blood cultures negative.
- Initial COVID test was negative, repeat test positive.   - Continue with supplemental oxygen with goal SpO2>92 wean as tolerated, if requires escalate to NRB and avoid BiLevel/High Flow Nasal Cannula per institution policy given risk of aerosolization  - Since COVID positive, c/w Hydroxychloroquine 400mg x2 doses and then 200mg BID for 4 days (completed) The Benefits/Risks/Alternatives were reportedly discussed with next of kin who reportedly were in agreement with therapy although not currently FDA-approved for the treatment of COVID19  - Trend ESR, CRP, Procalcitonin  - monitor EKG for QTc prolongation  - guaifenesin/tessalon perles/ acetaminophen prn. Supportive care.   - isolation precautions  - blood cultures negative.   - S/p CTX
- Initial COVID test was negative, repeat test positive.   - Continue with supplemental oxygen with goal SpO2>92 wean as tolerated, if requires escalate to NRB and avoid BiLevel/High Flow Nasal Cannula per institution policy given risk of aerosolization  - Since COVID positive, c/w Hydroxychloroquine 400mg x2 doses and then 200mg BID for 4 days. The Benefits/Risks/Alternatives were reportedly discussed with next of kin who reportedly were in agreement with therapy although not currently FDA-approved for the treatment of COVID19  - Trend ESR, CRP, Procalcitonin  - monitor EKG for QTc prolongation  - guaifenesin/tessalon perles/ acetaminophen prn. Supportive care.   - isolation precautions  - blood cultures negative.   - S/p CTX
-Initial COVID test was negative, repeat test positive.   - Continue with supplemental oxygen with goal SpO2>92 wean as tolerated, if requires escalate to NRB and avoid BiLevel/High Flow Nasal Cannula per institution policy given risk of aerosolization  - Since COVID positive, c/w Hydroxychloroquine 400mg x2 doses and then 200mg BID for 4 days. The Benefits/Risks/Alternatives were reportedly discussed with next of kin who reportedly were in agreement with therapy although not currently FDA-approved for the treatment of COVID19  -Trend ESR, CRP, Procalcitonin  - monitor EKG for QTc prolongation  - guaifenesin/tessalon perles/ acetaminophen prn. Supportive care.   - isolation precautions  - blood cultures negative.   -S/p CTX. Will finish 3 day course of azithromycin.
-Initial test was negative, repeat test positive.   - Continue with supplemental oxygen with goal SpO2>92 wean as tolerated, if requires escalate to NRB and avoid BiLevel/High Flow Nasal Cannula per institution policy given risk of aerosolization  - Since COVID positive, c/w Hydroxychloroquine 400mg x2 doses and then 200mg BID for 4 days. The Benefits/Risks/Alternatives were reportedly discussed with next of kin who reportedly were in agreement with therapy although not currently FDA-approved for the treatment of COVID19  -Trend ESR, CRP, Procalcitonin  - monitor EKG for QTc prolongation  - guaifenesin/tessalon perles/ acetaminophen prn. Supportive care.   - isolation precautions  - blood cultures negative.   -S/p CTX. Will finish 3 day course of azithromycin.

## 2020-04-05 NOTE — PROGRESS NOTE ADULT - PROBLEM SELECTOR PLAN 7
hold meloxicam for now  c/w acetaminophen

## 2020-04-05 NOTE — PROGRESS NOTE ADULT - PROBLEM SELECTOR PLAN 3
bioprosthetic valve, not on AC

## 2020-04-05 NOTE — PROGRESS NOTE ADULT - SUBJECTIVE AND OBJECTIVE BOX
[facility] Division of Hospital Medicine Progress Note    Patient is a 74y old  Female who presents with a chief complaint of cough and SOB (04 Apr 2020 13:22)      SUBJECTIVE / OVERNIGHT EVENTS:  Patient feeling better this AM, no complaints.  ADDITIONAL REVIEW OF SYSTEMS:  Denies abdominal pain    MEDICATIONS  (STANDING):  amLODIPine   Tablet 10 milliGRAM(s) Oral daily  ascorbic acid 500 milliGRAM(s) Oral daily  atorvastatin 20 milliGRAM(s) Oral at bedtime  benzonatate 100 milliGRAM(s) Oral every 8 hours  carvedilol 25 milliGRAM(s) Oral every 12 hours  dextrose 5%. 1000 milliLiter(s) (50 mL/Hr) IV Continuous <Continuous>  dextrose 50% Injectable 12.5 Gram(s) IV Push once  dextrose 50% Injectable 25 Gram(s) IV Push once  dextrose 50% Injectable 25 Gram(s) IV Push once  enoxaparin Injectable 40 milliGRAM(s) SubCutaneous daily  EPINEPHrine    Injectable 0.3 milliGRAM(s) IntraMuscular once  guaiFENesin  milliGRAM(s) Oral every 12 hours  insulin lispro (HumaLOG) corrective regimen sliding scale   SubCutaneous three times a day before meals  insulin lispro (HumaLOG) corrective regimen sliding scale   SubCutaneous at bedtime  pantoprazole    Tablet 40 milliGRAM(s) Oral before breakfast  sertraline 50 milliGRAM(s) Oral daily  thiamine 100 milliGRAM(s) Oral daily  zinc sulfate 220 milliGRAM(s) Oral daily    MEDICATIONS  (PRN):  acetaminophen   Tablet .. 650 milliGRAM(s) Oral every 6 hours PRN Temp greater or equal to 38C (100.4F), Mild Pain (1 - 3)  ALBUTerol    90 MICROgram(s) HFA Inhaler 2 Puff(s) Inhalation every 6 hours PRN Shortness of Breath and/or Wheezing  dextrose 40% Gel 15 Gram(s) Oral once PRN Blood Glucose LESS THAN 70 milliGRAM(s)/deciliter  diphenhydrAMINE   Injectable 50 milliGRAM(s) IV Push once PRN Reaction to Study Medication  glucagon  Injectable 1 milliGRAM(s) IntraMuscular once PRN Glucose LESS THAN 70 milligrams/deciliter      CAPILLARY BLOOD GLUCOSE      POCT Blood Glucose.: 156 mg/dL (05 Apr 2020 13:49)  POCT Blood Glucose.: 134 mg/dL (05 Apr 2020 09:55)  POCT Blood Glucose.: 128 mg/dL (04 Apr 2020 22:04)  POCT Blood Glucose.: 134 mg/dL (04 Apr 2020 18:57)    I&O's Summary    04 Apr 2020 07:01  -  05 Apr 2020 07:00  --------------------------------------------------------  IN: 240 mL / OUT: 0 mL / NET: 240 mL        PHYSICAL EXAM:  Vital Signs Last 24 Hrs  T(C): 36.8 (05 Apr 2020 12:13), Max: 37.2 (04 Apr 2020 18:50)  T(F): 98.2 (05 Apr 2020 12:13), Max: 99 (04 Apr 2020 18:50)  HR: 76 (05 Apr 2020 12:13) (75 - 82)  BP: 131/78 (05 Apr 2020 12:13) (131/78 - 158/89)  BP(mean): --  RR: 20 (05 Apr 2020 12:13) (20 - 21)  SpO2: 99% (05 Apr 2020 12:13) (98% - 99%)    CONSTITUTIONAL: NAD, well-developed  ENMT: Moist oral mucosa, no pharyngeal injection or exudates  RESPIRATORY: Normal respiratory effort; lungs are clear to auscultation bilaterally  CARDIOVASCULAR: Regular rate and rhythm, normal S1 and S2, no murmur/rub/gallop; No lower extremity edema; Peripheral pulses are 2+ bilaterally  ABDOMEN: Nontender to palpation, normoactive bowel sounds, no rebound/guarding; No hepatosplenomegaly  PSYCH: A+O to person, place, and time; affect appropriate  NEUROLOGY: CN 2-12 are intact and symmetric; no gross sensory deficits   SKIN: No rashes; no palpable lesions    LABS:                        10.9   5.15  )-----------( 528      ( 05 Apr 2020 08:02 )             36.3     04-05    142  |  103  |  16  ----------------------------<  128<H>  4.0   |  29  |  0.67    Ca    9.4      05 Apr 2020 08:03    TPro  6.8  /  Alb  3.1<L>  /  TBili  0.3  /  DBili  x   /  AST  40  /  ALT  46<H>  /  AlkPhos  110  04-05              COVID-19 PCR: Detected (03-28-20 @ 03:25)  COVID-19 PCR: NotDetec (03-27-20 @ 23:51)      RADIOLOGY & ADDITIONAL TESTS:  Imaging from Last 24 Hours:  Electrocardiogram/QTc Interval:    COORDINATION OF CARE:  Care Discussed with Consultants/Other Providers:

## 2020-04-05 NOTE — PROGRESS NOTE ADULT - PROBLEM SELECTOR PLAN 4
c/w norvasc, carvedilol ; hold losartan for now

## 2020-04-05 NOTE — PROGRESS NOTE ADULT - PROBLEM SELECTOR PLAN 6
-F/u HbA1c (collected)  -continue with sliding scale  -CC DASH diet.
-F/u HbA1c (drawn yesterday)  -continue with sliding scale  -CC DASH diet.
-continue with sliding scale  -CC DASH diet.
-continue with sliding scale  -CC DASH diet.
-F/u HbA1c   sliding scale  -CC DASH diet.

## 2020-04-05 NOTE — PROGRESS NOTE ADULT - PROBLEM SELECTOR PLAN 8
c/w sertraline

## 2020-04-05 NOTE — PROGRESS NOTE ADULT - PROBLEM SELECTOR PROBLEM 3
Aortic valve replaced

## 2020-04-05 NOTE — PROGRESS NOTE ADULT - PROBLEM SELECTOR PLAN 1
Patient with hypoxic respiratory failure, 2/2 to COVID.  still requiring a significant amt of O2 - now on 5-6L  completed plaquenil  consented to trial with ID  c/w other symptomatic management  -Zinc, thiamine, Vitamin C.   -Trend CRP, ESR, ferritin, LDH.

## 2020-04-05 NOTE — PROGRESS NOTE ADULT - PROBLEM SELECTOR PLAN 9
lovenox for DVT PPx.

## 2020-04-05 NOTE — PROGRESS NOTE ADULT - PROBLEM SELECTOR PROBLEM 6
T2DM (type 2 diabetes mellitus)

## 2020-04-06 LAB
ALBUMIN SERPL ELPH-MCNC: 3.5 G/DL — SIGNIFICANT CHANGE UP (ref 3.3–5)
ALP SERPL-CCNC: 108 U/L — SIGNIFICANT CHANGE UP (ref 40–120)
ALT FLD-CCNC: 53 U/L — HIGH (ref 10–45)
ANION GAP SERPL CALC-SCNC: 19 MMOL/L — HIGH (ref 5–17)
AST SERPL-CCNC: 47 U/L — HIGH (ref 10–40)
BILIRUB SERPL-MCNC: 0.4 MG/DL — SIGNIFICANT CHANGE UP (ref 0.2–1.2)
BUN SERPL-MCNC: 19 MG/DL — SIGNIFICANT CHANGE UP (ref 7–23)
CALCIUM SERPL-MCNC: 10.1 MG/DL — SIGNIFICANT CHANGE UP (ref 8.4–10.5)
CHLORIDE SERPL-SCNC: 103 MMOL/L — SIGNIFICANT CHANGE UP (ref 96–108)
CO2 SERPL-SCNC: 25 MMOL/L — SIGNIFICANT CHANGE UP (ref 22–31)
CREAT SERPL-MCNC: 0.81 MG/DL — SIGNIFICANT CHANGE UP (ref 0.5–1.3)
CRP SERPL-MCNC: 1.01 MG/DL — HIGH (ref 0–0.4)
FERRITIN SERPL-MCNC: 233 NG/ML — HIGH (ref 15–150)
GLUCOSE BLDC GLUCOMTR-MCNC: 103 MG/DL — HIGH (ref 70–99)
GLUCOSE BLDC GLUCOMTR-MCNC: 125 MG/DL — HIGH (ref 70–99)
GLUCOSE BLDC GLUCOMTR-MCNC: 165 MG/DL — HIGH (ref 70–99)
GLUCOSE SERPL-MCNC: 197 MG/DL — HIGH (ref 70–99)
HCT VFR BLD CALC: 38.1 % — SIGNIFICANT CHANGE UP (ref 34.5–45)
HGB BLD-MCNC: 11.4 G/DL — LOW (ref 11.5–15.5)
LDH SERPL L TO P-CCNC: 342 U/L — HIGH (ref 50–242)
MCHC RBC-ENTMCNC: 24.9 PG — LOW (ref 27–34)
MCHC RBC-ENTMCNC: 29.9 GM/DL — LOW (ref 32–36)
MCV RBC AUTO: 83.4 FL — SIGNIFICANT CHANGE UP (ref 80–100)
NRBC # BLD: 0 /100 WBCS — SIGNIFICANT CHANGE UP (ref 0–0)
PLATELET # BLD AUTO: 523 K/UL — HIGH (ref 150–400)
POTASSIUM SERPL-MCNC: 4.2 MMOL/L — SIGNIFICANT CHANGE UP (ref 3.5–5.3)
POTASSIUM SERPL-SCNC: 4.2 MMOL/L — SIGNIFICANT CHANGE UP (ref 3.5–5.3)
PROT SERPL-MCNC: 7.1 G/DL — SIGNIFICANT CHANGE UP (ref 6–8.3)
RBC # BLD: 4.57 M/UL — SIGNIFICANT CHANGE UP (ref 3.8–5.2)
RBC # FLD: 14.5 % — SIGNIFICANT CHANGE UP (ref 10.3–14.5)
SODIUM SERPL-SCNC: 147 MMOL/L — HIGH (ref 135–145)
WBC # BLD: 5.48 K/UL — SIGNIFICANT CHANGE UP (ref 3.8–10.5)
WBC # FLD AUTO: 5.48 K/UL — SIGNIFICANT CHANGE UP (ref 3.8–10.5)

## 2020-04-06 PROCEDURE — 99232 SBSQ HOSP IP/OBS MODERATE 35: CPT

## 2020-04-06 RX ADMIN — PANTOPRAZOLE SODIUM 40 MILLIGRAM(S): 20 TABLET, DELAYED RELEASE ORAL at 05:57

## 2020-04-06 RX ADMIN — Medication 100 MILLIGRAM(S): at 14:32

## 2020-04-06 RX ADMIN — Medication 2: at 14:19

## 2020-04-06 RX ADMIN — ATORVASTATIN CALCIUM 20 MILLIGRAM(S): 80 TABLET, FILM COATED ORAL at 21:02

## 2020-04-06 RX ADMIN — CARVEDILOL PHOSPHATE 25 MILLIGRAM(S): 80 CAPSULE, EXTENDED RELEASE ORAL at 05:57

## 2020-04-06 RX ADMIN — Medication 600 MILLIGRAM(S): at 19:05

## 2020-04-06 RX ADMIN — SERTRALINE 50 MILLIGRAM(S): 25 TABLET, FILM COATED ORAL at 14:32

## 2020-04-06 RX ADMIN — AMLODIPINE BESYLATE 10 MILLIGRAM(S): 2.5 TABLET ORAL at 05:57

## 2020-04-06 RX ADMIN — Medication 500 MILLIGRAM(S): at 14:33

## 2020-04-06 RX ADMIN — Medication 100 MILLIGRAM(S): at 05:57

## 2020-04-06 RX ADMIN — Medication 600 MILLIGRAM(S): at 05:57

## 2020-04-06 RX ADMIN — CARVEDILOL PHOSPHATE 25 MILLIGRAM(S): 80 CAPSULE, EXTENDED RELEASE ORAL at 19:05

## 2020-04-06 RX ADMIN — ENOXAPARIN SODIUM 40 MILLIGRAM(S): 100 INJECTION SUBCUTANEOUS at 14:32

## 2020-04-06 RX ADMIN — Medication 100 MILLIGRAM(S): at 21:02

## 2020-04-06 NOTE — PROGRESS NOTE ADULT - PROVIDER SPECIALTY LIST ADULT
Hospitalist
Internal Medicine

## 2020-04-06 NOTE — PROGRESS NOTE ADULT - REASON FOR ADMISSION
cough and SOB

## 2020-04-06 NOTE — PROGRESS NOTE ADULT - ASSESSMENT
COVID+, PNA  *continues to improve  -titrating down oxygen  -s/p hydroxychloroquine    PMHx - HTN, HLD  -home meds have been continued, losartan is held    lovenox ppx
73 yo female with PMH of HTN, HLD, T2DM, Arthritis, Aortic insufficiency s/p AVR (bioprosthetic), not on AC; presented here with SOB and cough. Found to be COVID positive.
75 yo female with PMH of HTN, HLD, T2DM, Arthritis, Aortic insufficiency s/p AVR (bioprosthetic), not on AC; presented here with SOB and cough. Found to be COVID positive.
Improving since last evening on 5L 93-94% sat  L/M with son news of improvement [phone]
Worsening O2 sat despite supportive care. Multiple co-morbidities D/W daughter Suzie [phone].  Advised comorbidities and deterioration. If intubation with this disease, survival is low and suffering is likely. She advises to continue FULL CODE for now.  I advised that she discuss with her brother today to consider DNR.    COVID PCR +  BCX NEGATIVE  
Worsening O2 sat despite supportive care. Multiple co-morbidities D/W daughter Suzie [phone].  Advised comorbidities and deterioration. If intubation with this disease, survival is low and suffering is likely. She advises to continue FULL CODE for now.  I advised that she discuss with her brother today to consider DNR.    COVID PCR +  BCX NEGATIVE  
75 yo female with PMH of HTN, HLD, T2DM, Arthritis, Aortic insufficiency s/p AVR (bioprosthetic), not on AC; presented here with SOB and cough. Found to be COVID positive.

## 2020-04-06 NOTE — PROGRESS NOTE ADULT - SUBJECTIVE AND OBJECTIVE BOX
[facility] Division of Hospital Medicine Progress Note    74 ad hx metabolic syndrome, aortic valve replacement  admitted 3/28 w/ fever cough SOB back pain for 3 days, COVID+  s/p hydroxychloroquine treatment  clinically stable, improving    MEDICATIONS  (STANDING):  amLODIPine   Tablet 10 milliGRAM(s) Oral daily  ascorbic acid 500 milliGRAM(s) Oral daily  atorvastatin 20 milliGRAM(s) Oral at bedtime  benzonatate 100 milliGRAM(s) Oral every 8 hours  carvedilol 25 milliGRAM(s) Oral every 12 hours  dextrose 5%. 1000 milliLiter(s) (50 mL/Hr) IV Continuous <Continuous>  dextrose 50% Injectable 12.5 Gram(s) IV Push once  dextrose 50% Injectable 25 Gram(s) IV Push once  dextrose 50% Injectable 25 Gram(s) IV Push once  enoxaparin Injectable 40 milliGRAM(s) SubCutaneous daily  EPINEPHrine    Injectable 0.3 milliGRAM(s) IntraMuscular once  guaiFENesin  milliGRAM(s) Oral every 12 hours  insulin lispro (HumaLOG) corrective regimen sliding scale   SubCutaneous three times a day before meals  insulin lispro (HumaLOG) corrective regimen sliding scale   SubCutaneous at bedtime  pantoprazole    Tablet 40 milliGRAM(s) Oral before breakfast  sertraline 50 milliGRAM(s) Oral daily  thiamine 100 milliGRAM(s) Oral daily  zinc sulfate 220 milliGRAM(s) Oral daily    MEDICATIONS  (PRN):  acetaminophen   Tablet .. 650 milliGRAM(s) Oral every 6 hours PRN Temp greater or equal to 38C (100.4F), Mild Pain (1 - 3)  ALBUTerol    90 MICROgram(s) HFA Inhaler 2 Puff(s) Inhalation every 6 hours PRN Shortness of Breath and/or Wheezing  dextrose 40% Gel 15 Gram(s) Oral once PRN Blood Glucose LESS THAN 70 milliGRAM(s)/deciliter  diphenhydrAMINE   Injectable 50 milliGRAM(s) IV Push once PRN Reaction to Study Medication  glucagon  Injectable 1 milliGRAM(s) IntraMuscular once PRN Glucose LESS THAN 70 milligrams/deciliter      CAPILLARY BLOOD GLUCOSE      POCT Blood Glucose.: 125 mg/dL (06 Apr 2020 09:21)  POCT Blood Glucose.: 160 mg/dL (05 Apr 2020 21:31)  POCT Blood Glucose.: 141 mg/dL (05 Apr 2020 18:16)  POCT Blood Glucose.: 156 mg/dL (05 Apr 2020 13:49)    I&O's Summary      PHYSICAL EXAM:  Vital Signs Last 24 Hrs  T(C): 36.8 (06 Apr 2020 05:49), Max: 36.8 (05 Apr 2020 23:45)  T(F): 98.3 (06 Apr 2020 05:49), Max: 98.3 (05 Apr 2020 23:45)  HR: 82 (06 Apr 2020 05:49) (82 - 88)  BP: 130/79 (06 Apr 2020 05:49) (130/79 - 152/82)  BP(mean): --  RR: 20 (06 Apr 2020 05:49) (20 - 20)  SpO2: 99% (06 Apr 2020 05:49) (97% - 99%)    Gen: NAD, sitting up in bed comfortable  Resp: no distress, good air movement  CV: regular    LABS:                        11.4   5.48  )-----------( 523      ( 06 Apr 2020 11:47 )             38.1     04-05    142  |  103  |  16  ----------------------------<  128<H>  4.0   |  29  |  0.67    Ca    9.4      05 Apr 2020 08:03    TPro  6.8  /  Alb  3.1<L>  /  TBili  0.3  /  DBili  x   /  AST  40  /  ALT  46<H>  /  AlkPhos  110  04-05    COVID-19 PCR: Detected (03-28-20 @ 03:25)  COVID-19 PCR: NotDetec (03-27-20 @ 23:51)    RADIOLOGY & ADDITIONAL TESTS:  Imaging from Last 24 Hours:  Electrocardiogram/QTc Interval:    COORDINATION OF CARE:  Care Discussed with Consultants/Other Providers:

## 2020-04-07 ENCOUNTER — TRANSCRIPTION ENCOUNTER (OUTPATIENT)
Age: 75
End: 2020-04-07

## 2020-04-07 LAB
ALBUMIN SERPL ELPH-MCNC: 3.4 G/DL — SIGNIFICANT CHANGE UP (ref 3.3–5)
ALP SERPL-CCNC: 100 U/L — SIGNIFICANT CHANGE UP (ref 40–120)
ALT FLD-CCNC: 61 U/L — HIGH (ref 10–45)
ANION GAP SERPL CALC-SCNC: 11 MMOL/L — SIGNIFICANT CHANGE UP (ref 5–17)
AST SERPL-CCNC: 52 U/L — HIGH (ref 10–40)
BILIRUB SERPL-MCNC: 0.4 MG/DL — SIGNIFICANT CHANGE UP (ref 0.2–1.2)
BUN SERPL-MCNC: 17 MG/DL — SIGNIFICANT CHANGE UP (ref 7–23)
CALCIUM SERPL-MCNC: 10 MG/DL — SIGNIFICANT CHANGE UP (ref 8.4–10.5)
CHLORIDE SERPL-SCNC: 103 MMOL/L — SIGNIFICANT CHANGE UP (ref 96–108)
CO2 SERPL-SCNC: 28 MMOL/L — SIGNIFICANT CHANGE UP (ref 22–31)
CREAT SERPL-MCNC: 0.66 MG/DL — SIGNIFICANT CHANGE UP (ref 0.5–1.3)
GLUCOSE BLDC GLUCOMTR-MCNC: 131 MG/DL — HIGH (ref 70–99)
GLUCOSE BLDC GLUCOMTR-MCNC: 144 MG/DL — HIGH (ref 70–99)
GLUCOSE BLDC GLUCOMTR-MCNC: 160 MG/DL — HIGH (ref 70–99)
GLUCOSE BLDC GLUCOMTR-MCNC: 179 MG/DL — HIGH (ref 70–99)
GLUCOSE SERPL-MCNC: 168 MG/DL — HIGH (ref 70–99)
HCT VFR BLD CALC: 39.4 % — SIGNIFICANT CHANGE UP (ref 34.5–45)
HGB BLD-MCNC: 12.1 G/DL — SIGNIFICANT CHANGE UP (ref 11.5–15.5)
MCHC RBC-ENTMCNC: 25.4 PG — LOW (ref 27–34)
MCHC RBC-ENTMCNC: 30.7 GM/DL — LOW (ref 32–36)
MCV RBC AUTO: 82.6 FL — SIGNIFICANT CHANGE UP (ref 80–100)
NRBC # BLD: 0 /100 WBCS — SIGNIFICANT CHANGE UP (ref 0–0)
PLATELET # BLD AUTO: 516 K/UL — HIGH (ref 150–400)
POTASSIUM SERPL-MCNC: 3.7 MMOL/L — SIGNIFICANT CHANGE UP (ref 3.5–5.3)
POTASSIUM SERPL-SCNC: 3.7 MMOL/L — SIGNIFICANT CHANGE UP (ref 3.5–5.3)
PROT SERPL-MCNC: 7.1 G/DL — SIGNIFICANT CHANGE UP (ref 6–8.3)
RBC # BLD: 4.77 M/UL — SIGNIFICANT CHANGE UP (ref 3.8–5.2)
RBC # FLD: 14.6 % — HIGH (ref 10.3–14.5)
SODIUM SERPL-SCNC: 142 MMOL/L — SIGNIFICANT CHANGE UP (ref 135–145)
WBC # BLD: 5.89 K/UL — SIGNIFICANT CHANGE UP (ref 3.8–10.5)
WBC # FLD AUTO: 5.89 K/UL — SIGNIFICANT CHANGE UP (ref 3.8–10.5)

## 2020-04-07 PROCEDURE — 99233 SBSQ HOSP IP/OBS HIGH 50: CPT

## 2020-04-07 RX ORDER — LOSARTAN POTASSIUM 100 MG/1
1 TABLET, FILM COATED ORAL
Qty: 30 | Refills: 0
Start: 2020-04-07 | End: 2020-05-06

## 2020-04-07 RX ORDER — LOSARTAN POTASSIUM 100 MG/1
1 TABLET, FILM COATED ORAL
Qty: 0 | Refills: 0 | DISCHARGE

## 2020-04-07 RX ORDER — POTASSIUM CHLORIDE 20 MEQ
1 PACKET (EA) ORAL
Qty: 0 | Refills: 0 | DISCHARGE

## 2020-04-07 RX ORDER — ACETAMINOPHEN 500 MG
2 TABLET ORAL
Qty: 0 | Refills: 0 | DISCHARGE
Start: 2020-04-07

## 2020-04-07 RX ORDER — MELOXICAM 15 MG/1
1 TABLET ORAL
Qty: 0 | Refills: 0 | DISCHARGE

## 2020-04-07 RX ORDER — ALBUTEROL 90 UG/1
2 AEROSOL, METERED ORAL
Qty: 1 | Refills: 0
Start: 2020-04-07 | End: 2020-05-06

## 2020-04-07 RX ADMIN — Medication 600 MILLIGRAM(S): at 04:21

## 2020-04-07 RX ADMIN — ENOXAPARIN SODIUM 40 MILLIGRAM(S): 100 INJECTION SUBCUTANEOUS at 11:04

## 2020-04-07 RX ADMIN — CARVEDILOL PHOSPHATE 25 MILLIGRAM(S): 80 CAPSULE, EXTENDED RELEASE ORAL at 04:21

## 2020-04-07 RX ADMIN — Medication 2: at 13:38

## 2020-04-07 RX ADMIN — SERTRALINE 50 MILLIGRAM(S): 25 TABLET, FILM COATED ORAL at 18:13

## 2020-04-07 RX ADMIN — Medication 100 MILLIGRAM(S): at 04:20

## 2020-04-07 RX ADMIN — Medication 500 MILLIGRAM(S): at 11:04

## 2020-04-07 RX ADMIN — AMLODIPINE BESYLATE 10 MILLIGRAM(S): 2.5 TABLET ORAL at 04:21

## 2020-04-07 RX ADMIN — Medication 100 MILLIGRAM(S): at 11:04

## 2020-04-07 RX ADMIN — ZINC SULFATE TAB 220 MG (50 MG ZINC EQUIVALENT) 220 MILLIGRAM(S): 220 (50 ZN) TAB at 11:04

## 2020-04-07 RX ADMIN — Medication 100 MILLIGRAM(S): at 22:53

## 2020-04-07 RX ADMIN — PANTOPRAZOLE SODIUM 40 MILLIGRAM(S): 20 TABLET, DELAYED RELEASE ORAL at 04:21

## 2020-04-07 RX ADMIN — Medication 600 MILLIGRAM(S): at 18:13

## 2020-04-07 RX ADMIN — CARVEDILOL PHOSPHATE 25 MILLIGRAM(S): 80 CAPSULE, EXTENDED RELEASE ORAL at 18:13

## 2020-04-07 RX ADMIN — ATORVASTATIN CALCIUM 20 MILLIGRAM(S): 80 TABLET, FILM COATED ORAL at 22:53

## 2020-04-07 NOTE — DISCHARGE NOTE PROVIDER - HOSPITAL COURSE
74 ad hx metabolic syndrome, aortic valve replacement    admitted 3/28 w/ fever cough SOB back pain for 3 days, COVID+    s/p hydroxychloroquine treatment    clinically stable, improving        COVID+, PNA    *continues to improve    -titrating down oxygen    -s/p hydroxychloroquine        PMHx - HTN, HLD    -home meds have been continued, losartan is h 74 ad hx metabolic syndrome, aortic valve replacement    admitted 3/28 w/ fever cough SOB back pain for 3 days, COVID+    s/p hydroxychloroquine treatment    clinically stable, improving        COVID+, PNA    *continues to improve    -titrating down oxygen    -s/p hydroxychloroquine        PMHx - HTN, HLD    -home meds have been continued, losartan to continue at 50 mg orally once a day 73yo F pmh HTN, DM2, aortic insufficiency s/p AVR not on AC admitted 3/28 with Acute hypoxic respiratory failure due to COVID pna: symptoms 3 days prior to admission, currently Day 11.  Initial test negative, repeat positive 3/28. CXR with pna 3/27.  Ferritin downtrended 382 to 233 and crp 2.11 to 1.01.  She received plaquenil 3/29-, and was enrolled in the Sarilumab IL6 trial 4/3 after which her oxygen requirements decreased.  Currently ambulating on RA without desaturations, Discharged home when family able to pick her up after a .        To follow up with pcp Margi Falcon.

## 2020-04-07 NOTE — DISCHARGE NOTE PROVIDER - CARE PROVIDER_API CALL
Mitchell Kiser)  Internal Medicine  [address] [place] [address]  Phone: [phone]  Fax: [phone]  Follow Up Time:

## 2020-04-07 NOTE — DISCHARGE NOTE PROVIDER - NSDCFUADDAPPT_GEN_ALL_CORE_FT
If unable to follow up with provider please call [phone] DOCS and state you were discharged from [facility] with COVID 19

## 2020-04-07 NOTE — DISCHARGE NOTE PROVIDER - NSDCMRMEDTOKEN_GEN_ALL_CORE_FT
acetaminophen 325 mg oral tablet: 2 tab(s) orally every 6 hours, As needed, Temp greater or equal to 38C (100.4F), Mild Pain (1 - 3)  albuterol 90 mcg/inh inhalation aerosol: 2 puff(s) inhaled every 6 hours, As needed, Shortness of Breath and/or Wheezing  atorvastatin 20 mg oral tablet: 1 tab(s) orally once a day  benzonatate 100 mg oral capsule: 1 cap(s) orally every 8 hours  carvedilol 25 mg oral tablet: 1 tab(s) orally 2 times a day  Farxiga 10 mg oral tablet: 1 tab(s) orally once a day  guaiFENesin 600 mg oral tablet, extended release: 1 tab(s) orally every 12 hours  Janumet XR 50 mg-1000 mg oral tablet, extended release: 1 tab(s) orally once a day (in the evening)  Klor-Con 20 mEq oral powder for reconstitution: 1 each orally once a day  losartan 100 mg oral tablet: 1 tab(s) orally once a day  meloxicam 7.5 mg oral tablet: 1 tab(s) orally once a day  Norvasc 10 mg oral tablet: 1 tab(s) orally once a day  pantoprazole 40 mg oral delayed release tablet: 1 tab(s) orally once a day (before a meal)  sertraline 50 mg oral tablet: 1 tab(s) orally once a day acetaminophen 325 mg oral tablet: 2 tab(s) orally every 6 hours, As needed, Temp greater or equal to 38C (100.4F), Mild Pain (1 - 3)  albuterol 90 mcg/inh inhalation aerosol: 2 puff(s) inhaled every 6 hours, As needed, Shortness of Breath and/or Wheezing  atorvastatin 20 mg oral tablet: 1 tab(s) orally once a day  benzonatate 100 mg oral capsule: 1 cap(s) orally every 8 hours  carvedilol 25 mg oral tablet: 1 tab(s) orally 2 times a day  Farxiga 10 mg oral tablet: 1 tab(s) orally once a day  guaiFENesin 600 mg oral tablet, extended release: 1 tab(s) orally every 12 hours  Janumet XR 50 mg-1000 mg oral tablet, extended release: 1 tab(s) orally once a day (in the evening)  losartan 50 mg oral tablet: 1 tab(s) orally once a day MDD:1  Norvasc 10 mg oral tablet: 1 tab(s) orally once a day  pantoprazole 40 mg oral delayed release tablet: 1 tab(s) orally once a day (before a meal)  sertraline 50 mg oral tablet: 1 tab(s) orally once a day

## 2020-04-07 NOTE — DISCHARGE NOTE PROVIDER - NSDCCPCAREPLAN_GEN_ALL_CORE_FT
PRINCIPAL DISCHARGE DIAGNOSIS  Diagnosis: Pneumonia  Assessment and Plan of Treatment: You tested positive for COVID 19.  You no longer require hospitalization.  Please restrict activities outside of your home except for getting medical care.  Do not go to work, school, or public areas.  Avoid using public transportation, ride-sharing, or taxis.  Separate yourself from other people and animals in your home.  Call ahead before visiting your doctor.  Wear a facemask when you are around other people. Cover your cough and sneezes.  Clean your hands often.  Avoid sharing personal household items.  Clean all frequently touched surfaces daily.

## 2020-04-07 NOTE — DISCHARGE NOTE PROVIDER - NSDCADMDATE_GEN_ALL_CORE_FT
Wt Readings from Last 3 Encounters:   10/07/19 81 1 kg (178 lb 12 7 oz)   09/30/19 88 3 kg (194 lb 10 7 oz)   07/18/19 83 5 kg (184 lb)     · Acute on chronic diastolic congestive heart failure on admission  Patient has had approximately 10 lb weight gain by report  · Echo shows an EF of 45%, study was technically difficult to evaluate for diastolic dysfunction  · CXR showing cardiomegaly  Pro-BNP elevated 12k  Troponin <0 02  · On Furosemide IV, dose increased 10/6 with significant improvement overnight  Patient symptomatically better, though she is a poor historian at baseline  · Cardiology input appreciated  Would anticipate transition to oral amiodarone the next 24 hours of continues to do well  · Daily weights, strict I/Os, low sodium diet  · Monitor electrolytes: keep K+ >4 and Mg >2  28-Mar-2020 03:11

## 2020-04-07 NOTE — CHART NOTE - NSCHARTNOTEFT_GEN_A_CORE
CC: covid pneumonia    75yo F pmh HTN, DM2, aortic insufficiency s/p AVR not on AC admitted 3/28 with Acute hypoxic respiratory failure due to COVID pna: symptoms 3 days prior to admission, currently Day 11.  Initial test negative, repeat positive 3/28. CXR with pna 3/27.  Ferritin downtrended 382 to 233 and crp 2.11 to 1.01.  She received plaquenil 3/29-4/2, and was enrolled in the Sarilumab IL6 trial 4/3 after which her oxygen requirements decreased.  Currently ambulating on RA without desaturations, plan to d/c home today.  AVSS, no distress.  To follow up with pcp Margi Falcon.  I spoke with son George on phone and updated regarding quarantine and discharge.    discharge time: 50 minutes.  Reviewed plan with AIDEE Peterson.  All questions answered.    Beeper: [phone] CC: covid pneumonia    S: no acute events overnight, no dyspnea    O: AVSS, NAD, lungs clear, RRR, no edema    A/P: 75yo F pmh HTN, DM2, aortic insufficiency s/p AVR not on AC admitted 3/28 with Acute hypoxic respiratory failure due to COVID pna: symptoms 3 days prior to admission, currently Day 11.  Initial test negative, repeat positive 3/28. CXR with pna 3/27.  Ferritin downtrended 382 to 233 and crp 2.11 to 1.01.  She received plaquenil 3/29-, and was enrolled in the Sarilumab IL6 trial 4/3 after which her oxygen requirements decreased.  Currently ambulating on RA without desaturations, plan to d/c home today.  AVSS, no distress.  To follow up with pcp Margi Falcon.  I spoke with son George on phone and updated regarding quarantine and discharge.    discharge time: 50 minutes.  Reviewed plan with NP Nicholas.  All questions answered.    Beeper: [phone]    Addendum:  D/c deferred until tomorrow.  Per RN, patient's sister passed away last week, patient not told this,  tomorrow.  Family is confirmed to pick her up at 4pm after  tomorrow ().

## 2020-04-08 ENCOUNTER — TRANSCRIPTION ENCOUNTER (OUTPATIENT)
Age: 75
End: 2020-04-08

## 2020-04-08 VITALS
OXYGEN SATURATION: 98 % | HEART RATE: 79 BPM | RESPIRATION RATE: 19 BRPM | SYSTOLIC BLOOD PRESSURE: 112 MMHG | DIASTOLIC BLOOD PRESSURE: 69 MMHG | TEMPERATURE: 99 F

## 2020-04-08 LAB
ALBUMIN SERPL ELPH-MCNC: 3.4 G/DL — SIGNIFICANT CHANGE UP (ref 3.3–5)
ALP SERPL-CCNC: 100 U/L — SIGNIFICANT CHANGE UP (ref 40–120)
ALT FLD-CCNC: 65 U/L — HIGH (ref 10–45)
ANION GAP SERPL CALC-SCNC: 11 MMOL/L — SIGNIFICANT CHANGE UP (ref 5–17)
AST SERPL-CCNC: 47 U/L — HIGH (ref 10–40)
BILIRUB SERPL-MCNC: 0.4 MG/DL — SIGNIFICANT CHANGE UP (ref 0.2–1.2)
BUN SERPL-MCNC: 18 MG/DL — SIGNIFICANT CHANGE UP (ref 7–23)
CALCIUM SERPL-MCNC: 9.5 MG/DL — SIGNIFICANT CHANGE UP (ref 8.4–10.5)
CHLORIDE SERPL-SCNC: 101 MMOL/L — SIGNIFICANT CHANGE UP (ref 96–108)
CO2 SERPL-SCNC: 28 MMOL/L — SIGNIFICANT CHANGE UP (ref 22–31)
CREAT SERPL-MCNC: 0.67 MG/DL — SIGNIFICANT CHANGE UP (ref 0.5–1.3)
CRP SERPL-MCNC: 0.31 MG/DL — SIGNIFICANT CHANGE UP (ref 0–0.4)
D DIMER BLD IA.RAPID-MCNC: 453 NG/ML DDU — HIGH
FERRITIN SERPL-MCNC: 228 NG/ML — HIGH (ref 15–150)
GLUCOSE BLDC GLUCOMTR-MCNC: 138 MG/DL — HIGH (ref 70–99)
GLUCOSE BLDC GLUCOMTR-MCNC: 182 MG/DL — HIGH (ref 70–99)
GLUCOSE SERPL-MCNC: 151 MG/DL — HIGH (ref 70–99)
HCT VFR BLD CALC: 37.5 % — SIGNIFICANT CHANGE UP (ref 34.5–45)
HGB BLD-MCNC: 11.8 G/DL — SIGNIFICANT CHANGE UP (ref 11.5–15.5)
MCHC RBC-ENTMCNC: 25.3 PG — LOW (ref 27–34)
MCHC RBC-ENTMCNC: 31.5 GM/DL — LOW (ref 32–36)
MCV RBC AUTO: 80.3 FL — SIGNIFICANT CHANGE UP (ref 80–100)
NRBC # BLD: 0 /100 WBCS — SIGNIFICANT CHANGE UP (ref 0–0)
PLATELET # BLD AUTO: 510 K/UL — HIGH (ref 150–400)
POTASSIUM SERPL-MCNC: 3.6 MMOL/L — SIGNIFICANT CHANGE UP (ref 3.5–5.3)
POTASSIUM SERPL-SCNC: 3.6 MMOL/L — SIGNIFICANT CHANGE UP (ref 3.5–5.3)
PROT SERPL-MCNC: 6.9 G/DL — SIGNIFICANT CHANGE UP (ref 6–8.3)
RBC # BLD: 4.67 M/UL — SIGNIFICANT CHANGE UP (ref 3.8–5.2)
RBC # FLD: 14.8 % — HIGH (ref 10.3–14.5)
SODIUM SERPL-SCNC: 140 MMOL/L — SIGNIFICANT CHANGE UP (ref 135–145)
WBC # BLD: 5.69 K/UL — SIGNIFICANT CHANGE UP (ref 3.8–10.5)
WBC # FLD AUTO: 5.69 K/UL — SIGNIFICANT CHANGE UP (ref 3.8–10.5)

## 2020-04-08 PROCEDURE — 85730 THROMBOPLASTIN TIME PARTIAL: CPT

## 2020-04-08 PROCEDURE — 82962 GLUCOSE BLOOD TEST: CPT

## 2020-04-08 PROCEDURE — 81001 URINALYSIS AUTO W/SCOPE: CPT

## 2020-04-08 PROCEDURE — 93005 ELECTROCARDIOGRAM TRACING: CPT

## 2020-04-08 PROCEDURE — 87635 SARS-COV-2 COVID-19 AMP PRB: CPT

## 2020-04-08 PROCEDURE — 84145 PROCALCITONIN (PCT): CPT

## 2020-04-08 PROCEDURE — 85027 COMPLETE CBC AUTOMATED: CPT

## 2020-04-08 PROCEDURE — 85610 PROTHROMBIN TIME: CPT

## 2020-04-08 PROCEDURE — 87449 NOS EACH ORGANISM AG IA: CPT

## 2020-04-08 PROCEDURE — 84484 ASSAY OF TROPONIN QUANT: CPT

## 2020-04-08 PROCEDURE — 83735 ASSAY OF MAGNESIUM: CPT

## 2020-04-08 PROCEDURE — 82550 ASSAY OF CK (CPK): CPT

## 2020-04-08 PROCEDURE — 80053 COMPREHEN METABOLIC PANEL: CPT

## 2020-04-08 PROCEDURE — 82728 ASSAY OF FERRITIN: CPT

## 2020-04-08 PROCEDURE — 82248 BILIRUBIN DIRECT: CPT

## 2020-04-08 PROCEDURE — 99285 EMERGENCY DEPT VISIT HI MDM: CPT

## 2020-04-08 PROCEDURE — 83615 LACTATE (LD) (LDH) ENZYME: CPT

## 2020-04-08 PROCEDURE — 85379 FIBRIN DEGRADATION QUANT: CPT

## 2020-04-08 PROCEDURE — 87040 BLOOD CULTURE FOR BACTERIA: CPT

## 2020-04-08 PROCEDURE — 71045 X-RAY EXAM CHEST 1 VIEW: CPT

## 2020-04-08 PROCEDURE — 85652 RBC SED RATE AUTOMATED: CPT

## 2020-04-08 PROCEDURE — 86140 C-REACTIVE PROTEIN: CPT

## 2020-04-08 PROCEDURE — 84100 ASSAY OF PHOSPHORUS: CPT

## 2020-04-08 RX ADMIN — Medication 2: at 13:51

## 2020-04-08 RX ADMIN — Medication 500 MILLIGRAM(S): at 13:53

## 2020-04-08 RX ADMIN — Medication 100 MILLIGRAM(S): at 13:53

## 2020-04-08 RX ADMIN — Medication 100 MILLIGRAM(S): at 05:28

## 2020-04-08 RX ADMIN — PANTOPRAZOLE SODIUM 40 MILLIGRAM(S): 20 TABLET, DELAYED RELEASE ORAL at 05:28

## 2020-04-08 RX ADMIN — ZINC SULFATE TAB 220 MG (50 MG ZINC EQUIVALENT) 220 MILLIGRAM(S): 220 (50 ZN) TAB at 13:55

## 2020-04-08 RX ADMIN — CARVEDILOL PHOSPHATE 25 MILLIGRAM(S): 80 CAPSULE, EXTENDED RELEASE ORAL at 05:28

## 2020-04-08 RX ADMIN — Medication 600 MILLIGRAM(S): at 05:28

## 2020-04-08 RX ADMIN — SERTRALINE 50 MILLIGRAM(S): 25 TABLET, FILM COATED ORAL at 13:52

## 2020-04-08 RX ADMIN — Medication 100 MILLIGRAM(S): at 13:52

## 2020-04-08 RX ADMIN — AMLODIPINE BESYLATE 10 MILLIGRAM(S): 2.5 TABLET ORAL at 05:28

## 2020-04-08 RX ADMIN — ENOXAPARIN SODIUM 40 MILLIGRAM(S): 100 INJECTION SUBCUTANEOUS at 13:54

## 2020-04-08 NOTE — DISCHARGE NOTE NURSING/CASE MANAGEMENT/SOCIAL WORK - PATIENT PORTAL LINK FT
You can access the FollowMyHealth Patient Portal offered by [facility] by registering at the following website: [email or web address]. By joining [employer]’s FollowMyHealth portal, you will also be able to view your health information using other applications (apps) compatible with our system.

## 2020-04-08 NOTE — DISCHARGE NOTE NURSING/CASE MANAGEMENT/SOCIAL WORK - NSDCFUADDAPPT_GEN_ALL_CORE_FT
If unable to follow up with provider please call [phone] DOCS and state you were discharged from [facility] with COVID 19

## 2023-03-10 NOTE — PATIENT PROFILE ADULT - HAS THE PATIENT BEEN ADMITTED FROM SHORT TERM REHAB?
apixaban 5 mg oral tablet: 1 tab(s) orally every 12 hours  aspirin 81 mg oral tablet, chewable: 1 tab(s) orally once a day  atorvastatin 80 mg oral tablet: 1 tab(s) orally once a day (at bedtime)  colchicine 0.6 mg oral tablet: 1 tab(s) orally 2 times a day  Digox 125 mcg (0.125 mg) oral tablet: 1 tab(s) orally once a day  levothyroxine 50 mcg (0.05 mg) oral tablet: 1 tab(s) orally once a day  magnesium oxide 400 mg oral tablet: 1 tab(s) orally 3 times a day (with meals)  memantine 5 mg oral tablet: 1 tab(s) orally once a day  Metoprolol Tartrate 50 mg oral tablet: 1 tab(s) orally 2 times a day  mirtazapine 7.5 mg oral tablet: 1 tab(s) orally once a day (at bedtime)  pantoprazole 40 mg oral delayed release tablet: 1 tab(s) orally once a day (before a meal)  polyethylene glycol 3350 oral powder for reconstitution: 17 gram(s) orally once a day  predniSONE 10 mg oral tablet: 3 tab(s) orally once a day until 3/9 then   2 tabs orally once a day until 3/16 then  1 tab orally once a day until 3/23 then stop   senna leaf extract oral tablet: 2 tab(s) orally once a day (at bedtime)   apixaban 5 mg oral tablet: 1 tab(s) orally every 12 hours  aspirin 81 mg oral tablet, chewable: 1 tab(s) orally once a day  atorvastatin 80 mg oral tablet: 1 tab(s) orally once a day (at bedtime)  colchicine 0.6 mg oral tablet: 1 tab(s) orally 2 times a day  Digox 125 mcg (0.125 mg) oral tablet: 1 tab(s) orally once a day  levothyroxine 50 mcg (0.05 mg) oral tablet: 1 tab(s) orally once a day  magnesium oxide 400 mg oral tablet: 1 tab(s) orally 3 times a day (with meals)  memantine 5 mg oral tablet: 1 tab(s) orally once a day  Metoprolol Tartrate 50 mg oral tablet: 1 tab(s) orally every 8 hours  mirtazapine 7.5 mg oral tablet: 1 tab(s) orally once a day (at bedtime)  pantoprazole 40 mg oral delayed release tablet: 1 tab(s) orally once a day (before a meal)  polyethylene glycol 3350 oral powder for reconstitution: 17 gram(s) orally once a day  predniSONE 10 mg oral tablet: 3 tab(s) orally once a day until 3/9 then   2 tabs orally once a day until 3/16 then  1 tab orally once a day until 3/23 then stop   senna leaf extract oral tablet: 2 tab(s) orally once a day (at bedtime)   apixaban 5 mg oral tablet: 1 tab(s) orally every 12 hours  aspirin 81 mg oral tablet, chewable: 1 tab(s) orally once a day  atorvastatin 80 mg oral tablet: 1 tab(s) orally once a day (at bedtime)  colchicine 0.6 mg oral tablet: 1 tab(s) orally 2 times a day  Digox 125 mcg (0.125 mg) oral tablet: 1 tab(s) orally once a day  levothyroxine 50 mcg (0.05 mg) oral tablet: 1 tab(s) orally once a day  magnesium oxide 400 mg oral tablet: 1 tab(s) orally 3 times a day (with meals)  memantine 5 mg oral tablet: 1 tab(s) orally once a day  Metoprolol Tartrate 50 mg oral tablet: 1 tab(s) orally every 8 hours  mirtazapine 7.5 mg oral tablet: 1 tab(s) orally once a day (at bedtime)  pantoprazole 40 mg oral delayed release tablet: 1 tab(s) orally once a day (before a meal)  polyethylene glycol 3350 oral powder for reconstitution: 17 gram(s) orally once a day  predniSONE 10 mg oral tablet: 1 tab(s) orally once a day for seven days until March 23 (start on March 17)  predniSONE 20 mg oral tablet: 1 tab(s) orally once a day until March 16  senna leaf extract oral tablet: 2 tab(s) orally once a day (at bedtime)   no